# Patient Record
Sex: MALE | Race: WHITE | NOT HISPANIC OR LATINO | Employment: OTHER | URBAN - METROPOLITAN AREA
[De-identification: names, ages, dates, MRNs, and addresses within clinical notes are randomized per-mention and may not be internally consistent; named-entity substitution may affect disease eponyms.]

---

## 2021-06-17 ENCOUNTER — TELEPHONE (OUTPATIENT)
Dept: PAIN MEDICINE | Facility: CLINIC | Age: 66
End: 2021-06-17

## 2021-06-17 NOTE — TELEPHONE ENCOUNTER
LM on VM for patient to call his insurance company and make sure Dr Anh Ambrosio is PAR  Or to find out if he has new insurance? Insurance in his chart is showing [employer], most of them we do not take  It is hard to know without a scan of the card  Also checked on [employer]ail[employer] with Cap Reynaldo, and the patient is not found  Provided call back number should the patient have any questions

## 2021-06-30 NOTE — PROGRESS NOTES
Assessment and Plan:   Patient is a 28-year-old male with history of polymyalgia rheumatica who presents for rheumatology evaluation  Records were reviewed from previous rheumatologist at [facility] who she last saw in 2019  When he initially presented to them in 2018, he had stiffness and arthralgia in his hips and shoulders but also reports multiple other joints including his elbows, hands, knees and ankles  He does have known advanced osteoarthritis in the left ankle and right knee, and previously was considering joint replacements, but then the pandemic occurred and he also retired and lost insurance  He needs to reestablish with an orthopedic  He otherwise reports he is feeling similar to how he did when he initially met with [facility]  Has stiffness in the morning for at least 1 hour in his joints and he again complains of multiple sites of joint pain, not just the shoulders and hips  We discussed that we will reassess some blood work to look for systemic inflammation which I would expect with PMR  He had mild inflammation in the blood in 2018  We discussed some of the joint pain might be regular osteoarthritis and my not all be related to PMR  He was understanding and will get the blood work drawn  We will limited to what we absolutely need since he reports a high out-of-pocket deductible for his testing  For this reason we will follow-up in 3 months but in the meantime we will let him know how the blood work looks and if we should start on the prednisone      Plan:  Diagnoses and all orders for this visit:    PMR (polymyalgia rheumatica) (HCC)  -     Cyclic citrul peptide antibody, IgG  -     C-reactive protein  -     Sjogren's Antibodies  -     Sedimentation rate, automated  -     RF Screen w/ Reflex to Titer  -     Comprehensive metabolic panel  -     CBC and differential    Polyarthralgia  -     Cyclic citrul peptide antibody, IgG  -     C-reactive protein  -     Sjogren's Antibodies  -     Sedimentation rate, automated  -     RF Screen w/ Reflex to Titer  -     Comprehensive metabolic panel  -     CBC and differential    Primary osteoarthritis involving multiple joints    Other orders  -     Cancel: Chronic Hepatitis Panel        Follow-up plan: 3 months       HPI  Bryce Muñoz is a 63 y o   male with asthma, COPD, h/o 3 left ankle surgeries, right knee OA, cervical DDD/OA with radiculopathy, H/O mediastinal mass biopsy who presents for rheumatology evaluation for h/o PMR  Chart review shows patient previously followed with [facility] rheum, Dr Daisy Griffin, records reviewed, last seen 7/2019  -Initial rheum eval 5/2018 for stiffness and arthralgia B/L shoulders and hips, mild elevation in ESR of 35 and CRP 14, started pred 10mg for suspected PMR with good response; weaned down by 1mg over prolonged period of time   -Labs 5/2018: ESR 35, CRP 14 8, neg DE, RF, CCP     He has not seen any doctors in about 2 years  He retired in 8/2020 and did not have insurance until sometime last year  Still needs to get a PCP  He was on prednisone up until oct 2019 and then stopped it when he lost insurance and his doctors  Recalls when he saw rheum then he was having issues with his shoulders, elbows, wrists, hands, hips  Morning stiffness for at least 1 hour  Feels better with movement and he feels better as the day goes on  Feels worse compared to when he initially saw rheum in 2018, but feels exactly the same as it did when he first saw them  Has felt worse again for about 1 5 years now  Before this, he got injured at work, 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, so he saw Dr Eleno Ramos at that time, this was around 2010  He recalls taking prednisone which he took for 1 year, then never saw her again after he completed the prednisone     With [facility] rheum, he felt significantly better after starting the prednisone 10mg, this kicked in on day 7 and felt a drastic overnight change  He recalls weaning down by 1mg every few months and did ok with this until he reached 4mg daily and then was stuck at that dose, couldn't wean further  Denies photosensitivity, rashes, psoriasis, sicca symptoms, oral or nasal ulcers, alopecia, Raynaud's, h/o pericarditis or pleurisy, h/o blood clots  He [facility] but reports this surgeon was experienced with ankle replacements so he deferred this  He was supposed to get the R knee TKA in 2019 but then retired and lost his insurance  He has not seen ortho yet again in f/u since then  Currently taking Tylenol Arthritis  Also recalls being on gabapentin through [facility] but has been off of this since 2019  Review of Systems  Review of Systems   Constitutional: Negative for chills, fatigue, fever and unexpected weight change  HENT: Negative for mouth sores and trouble swallowing  Eyes: Negative for pain and visual disturbance  Respiratory: Negative for cough and shortness of breath  Cardiovascular: Negative for chest pain and leg swelling  Gastrointestinal: Negative for abdominal pain, blood in stool, constipation, diarrhea and nausea  Musculoskeletal: Positive for arthralgias and neck pain  Negative for back pain, joint swelling and myalgias  Skin: Negative for color change and rash  Neurological: Negative for weakness and numbness  Hematological: Negative for adenopathy  Psychiatric/Behavioral: Negative for sleep disturbance  Allergies  No Known Allergies    Home Medications  No current outpatient medications on file      Past Medical History  Past Medical History:   Diagnosis Date    COPD [facility]chronic obstructive pulmonary disease) [facility]     PMR [facility]polymyalgia rheumatica) ([facility]     Spinal arthritis        Past Surgical History   Past Surgical History:   Procedure Laterality Date    ANKLE SURGERY      KNEE SURGERY         Family History  No known family history of autoimmune or inflammatory diseases  History reviewed  No pertinent family history  Social History  Occupation: retired, used to work security [employer]   Social History     Substance and Sexual Activity   Alcohol Use Yes    Comment: socially biweekly     Social History     Substance and Sexual Activity   Drug Use Never     Social History     Tobacco Use   Smoking Status Current Every Day Smoker    Packs/day: 0 50    Types: Cigarettes   Smokeless Tobacco Never Used       Objective:    Vitals:    07/02/21 1252   Pulse: 80   Weight: 95 2 kg (209 lb 12 8 oz)   Height: 5' 6 5" (1 689 m)       Physical Exam  Constitutional:       General: He is not in acute distress  Appearance: He is well-developed  HENT:      Head: Normocephalic and atraumatic  Eyes:      General: Lids are normal  No scleral icterus  Conjunctiva/sclera: Conjunctivae normal    Pulmonary:      Effort: Pulmonary effort is normal  No tachypnea, accessory muscle usage or respiratory distress  Musculoskeletal:      Cervical back: Neck supple  Comments: No joint swelling or synovitis anywhere  Stiffness in both shoulders with passive ROM   Skin:     General: Skin is dry  Findings: No rash  Neurological:      Mental Status: He is alert  Comments: 5/5 motor strength in bilateral upper and lower extremities   Psychiatric:         Behavior: Behavior normal  Behavior is cooperative  Imaging:   MRI cervical 2018: IMPRESSION:     1  No acute injury of the cervical spine or cervical spinal cord  2  Multilevel moderate to severe chronic degenerative changes as   detailed above  These have generally slightly progressed since the   prior study in 2015    3  1 5 cm cystic-appearing lesion along of the RIGHT oral pharyngeal   mucosa at the level of the tongue base/lingual tonsil   Likely   represents mucus retention cyst  However, this could be evaluated   with direct clinical inspection and/or neck CT with IV contrast for   further evaluation as clinically indicated         Labs:    Ref Range & Units 7/12/19  6:09 AM   Sed Rate 0 - 20 mm/hr 11       Ref Range & Units 7/12/19  6:09 AM   C-Reactive Protein <7 0 mg/L 3 1       Ref Range & Units 5/7/19  6:26 AM   Hemoglobin 12 5 - 17 0 g/dL 14 3        Hematocrit 37 0 - 48 0 % 43 2        WBC 4 0 - 10 5 thou/cmm 9 1        RBC 4 00 - 5 40 mill/cmm 4 71        Platelet Count 314 - 350 thou/cmm 250        MPV 7 5 - 11 3 fL 7 4Low         MCV 80 - 100 fL 92        MCH 27 - 36 pg 30 4        MCHC 32 - 37 g/dL 33 2        RDW 12 0 - 16 0 % 13 7        Morphology   Not performed        Differential Type   AUTO        Absolute Neutrophils 1 8 - 7 9 thou/cmm 4 8        Absolute Lymphocytes 0 6 - 4 2 thou/cmm 2 9        Absolute Monocytes 0 2 - 1 4 thou/cmm 0 8        Absolute Eosinophils 0 - 0 7 thou/cmm 0 5        Absolute Basophils 0 - 0 2 thou/cmm 0 0        Neutrophils 45 - 75 % 54        Lymphocytes 15 - 40 % 32        Monocytes 5 - 13 % 9        Eosinophils 0 - 7 % 5        Basophils 0 - 2 % 0       Ref Range & Units 5/7/19  6:26 AM   Glucose 65 - 99 mg/dL 91        BUN 7 - 28 mg/dL 18        Creatinine 0 53 - 1 30 mg/dL 0 88        Sodium 135 - 145 mmol/L 141        Potassium 3 5 - 5 2 mmol/L 4 1        Chloride 100 - 109 mmol/L 107        Carbon Dioxide 23 - 31 mmol/L 27        Calcium 8 5 - 10 1 mg/dL 9 0        Alkaline Phosphatase 35 - 120 U/L 106        Albumin 3 5 - 4 8 g/dL 3 9        Bilirubin, Total 0 2 - 1 0 mg/dL 0 4        Protein, Total 6 3 - 8 3 g/dL 7 1        AST <41 U/L 12        ALT <56 U/L 26        Anion Gap 3 - 11  7        GFR, Calculated >60 mL/min/1 73m2 93

## 2021-07-02 ENCOUNTER — APPOINTMENT (OUTPATIENT)
Dept: LAB | Facility: CLINIC | Age: 66
End: 2021-07-02
Payer: COMMERCIAL

## 2021-07-02 ENCOUNTER — OFFICE VISIT (OUTPATIENT)
Dept: RHEUMATOLOGY | Facility: CLINIC | Age: 66
End: 2021-07-02
Payer: COMMERCIAL

## 2021-07-02 VITALS — BODY MASS INDEX: 32.93 KG/M2 | WEIGHT: 209.8 LBS | HEIGHT: 67 IN | HEART RATE: 80 BPM

## 2021-07-02 DIAGNOSIS — M15.9 PRIMARY OSTEOARTHRITIS INVOLVING MULTIPLE JOINTS: ICD-10-CM

## 2021-07-02 DIAGNOSIS — M25.50 POLYARTHRALGIA: ICD-10-CM

## 2021-07-02 DIAGNOSIS — M35.3 PMR (POLYMYALGIA RHEUMATICA) (HCC): Primary | ICD-10-CM

## 2021-07-02 LAB
ALBUMIN SERPL BCP-MCNC: 4.2 G/DL (ref 3.5–5)
ALP SERPL-CCNC: 102 U/L (ref 46–116)
ALT SERPL W P-5'-P-CCNC: 33 U/L (ref 12–78)
ANION GAP SERPL CALCULATED.3IONS-SCNC: 10 MMOL/L (ref 4–13)
AST SERPL W P-5'-P-CCNC: 18 U/L (ref 5–45)
BASOPHILS # BLD AUTO: 0.06 THOUSANDS/ΜL (ref 0–0.1)
BASOPHILS NFR BLD AUTO: 1 % (ref 0–1)
BILIRUB SERPL-MCNC: 0.62 MG/DL (ref 0.2–1)
BUN SERPL-MCNC: 17 MG/DL (ref 5–25)
CALCIUM SERPL-MCNC: 8.8 MG/DL (ref 8.3–10.1)
CHLORIDE SERPL-SCNC: 105 MMOL/L (ref 100–108)
CO2 SERPL-SCNC: 26 MMOL/L (ref 21–32)
CREAT SERPL-MCNC: 0.88 MG/DL (ref 0.6–1.3)
CRP SERPL QL: 4.5 MG/L
EOSINOPHIL # BLD AUTO: 0.17 THOUSAND/ΜL (ref 0–0.61)
EOSINOPHIL NFR BLD AUTO: 2 % (ref 0–6)
ERYTHROCYTE [DISTWIDTH] IN BLOOD BY AUTOMATED COUNT: 13.2 % (ref 11.6–15.1)
ERYTHROCYTE [SEDIMENTATION RATE] IN BLOOD: 12 MM/HOUR (ref 0–19)
GFR SERPL CREATININE-BSD FRML MDRD: 91 ML/MIN/1.73SQ M
GLUCOSE P FAST SERPL-MCNC: 102 MG/DL (ref 65–99)
HCT VFR BLD AUTO: 48.6 % (ref 36.5–49.3)
HGB BLD-MCNC: 15.9 G/DL (ref 12–17)
IMM GRANULOCYTES # BLD AUTO: 0.03 THOUSAND/UL (ref 0–0.2)
IMM GRANULOCYTES NFR BLD AUTO: 0 % (ref 0–2)
LYMPHOCYTES # BLD AUTO: 2.45 THOUSANDS/ΜL (ref 0.6–4.47)
LYMPHOCYTES NFR BLD AUTO: 27 % (ref 14–44)
MCH RBC QN AUTO: 30.3 PG (ref 26.8–34.3)
MCHC RBC AUTO-ENTMCNC: 32.7 G/DL (ref 31.4–37.4)
MCV RBC AUTO: 93 FL (ref 82–98)
MONOCYTES # BLD AUTO: 0.68 THOUSAND/ΜL (ref 0.17–1.22)
MONOCYTES NFR BLD AUTO: 8 % (ref 4–12)
NEUTROPHILS # BLD AUTO: 5.67 THOUSANDS/ΜL (ref 1.85–7.62)
NEUTS SEG NFR BLD AUTO: 62 % (ref 43–75)
NRBC BLD AUTO-RTO: 0 /100 WBCS
PLATELET # BLD AUTO: 263 THOUSANDS/UL (ref 149–390)
PMV BLD AUTO: 9 FL (ref 8.9–12.7)
POTASSIUM SERPL-SCNC: 3.8 MMOL/L (ref 3.5–5.3)
PROT SERPL-MCNC: 8 G/DL (ref 6.4–8.2)
RBC # BLD AUTO: 5.24 MILLION/UL (ref 3.88–5.62)
SODIUM SERPL-SCNC: 141 MMOL/L (ref 136–145)
WBC # BLD AUTO: 9.06 THOUSAND/UL (ref 4.31–10.16)

## 2021-07-02 PROCEDURE — 86200 CCP ANTIBODY: CPT | Performed by: INTERNAL MEDICINE

## 2021-07-02 PROCEDURE — 85652 RBC SED RATE AUTOMATED: CPT | Performed by: INTERNAL MEDICINE

## 2021-07-02 PROCEDURE — 86140 C-REACTIVE PROTEIN: CPT | Performed by: INTERNAL MEDICINE

## 2021-07-02 PROCEDURE — 80053 COMPREHEN METABOLIC PANEL: CPT | Performed by: INTERNAL MEDICINE

## 2021-07-02 PROCEDURE — 36415 COLL VENOUS BLD VENIPUNCTURE: CPT | Performed by: INTERNAL MEDICINE

## 2021-07-02 PROCEDURE — 86235 NUCLEAR ANTIGEN ANTIBODY: CPT | Performed by: INTERNAL MEDICINE

## 2021-07-02 PROCEDURE — 99205 OFFICE O/P NEW HI 60 MIN: CPT | Performed by: INTERNAL MEDICINE

## 2021-07-02 PROCEDURE — 85025 COMPLETE CBC W/AUTO DIFF WBC: CPT | Performed by: INTERNAL MEDICINE

## 2021-07-02 PROCEDURE — 86430 RHEUMATOID FACTOR TEST QUAL: CPT | Performed by: INTERNAL MEDICINE

## 2021-07-03 LAB
ENA SS-A AB SER-ACNC: <0.2 AI (ref 0–0.9)
ENA SS-B AB SER-ACNC: <0.2 AI (ref 0–0.9)

## 2021-07-06 ENCOUNTER — TELEPHONE (OUTPATIENT)
Dept: RHEUMATOLOGY | Facility: CLINIC | Age: 66
End: 2021-07-06

## 2021-07-06 DIAGNOSIS — M15.9 PRIMARY OSTEOARTHRITIS INVOLVING MULTIPLE JOINTS: Primary | ICD-10-CM

## 2021-07-06 LAB
CCP AB SER IA-ACNC: 0.7
RHEUMATOID FACT SER QL LA: NEGATIVE

## 2021-07-06 RX ORDER — MELOXICAM 15 MG/1
15 TABLET ORAL DAILY PRN
Qty: 30 TABLET | Refills: 2 | Status: SHIPPED | OUTPATIENT
Start: 2021-07-06 | End: 2021-08-20 | Stop reason: ALTCHOICE

## 2021-07-06 NOTE — TELEPHONE ENCOUNTER
Please let patient know the labs do not show any significant inflammation that I would expect with a flare of PMR  He might be experiencing pain from regular osteoarthritis rather than PMR  Does he want to try a prescription NSAID?  I am not sure the prednisone would really help without any inflammation in the blood work

## 2021-08-20 DIAGNOSIS — M15.9 PRIMARY OSTEOARTHRITIS INVOLVING MULTIPLE JOINTS: Primary | ICD-10-CM

## 2021-08-20 RX ORDER — CELECOXIB 200 MG/1
200 CAPSULE ORAL 2 TIMES DAILY PRN
Qty: 60 CAPSULE | Refills: 2 | Status: SHIPPED | OUTPATIENT
Start: 2021-08-20 | End: 2021-10-07 | Stop reason: ALTCHOICE

## 2021-08-20 NOTE — TELEPHONE ENCOUNTER
Patient advised he was prescribed Meloxicam  He advised it is not working and he would like to know if there is something different you can prescribe  Patient can be reached at [phone]   Thank you

## 2021-09-03 NOTE — TELEPHONE ENCOUNTER
Patient spoke with billing and insurance, one of the labs coded  price $197 was not covered due to coding error, Please contact patient back, he has nothing that states which lab

## 2021-09-03 NOTE — TELEPHONE ENCOUNTER
Not sure what this means   Tried to look up this code and cannot find anything, can you get more info

## 2021-09-22 NOTE — TELEPHONE ENCOUNTER
Please let patient know he has osteoarthritis and I really cannot excuse him from jury duty for this  I cannot comment on his COPD he needs to check with his pulm or family doctor for that

## 2021-09-22 NOTE — TELEPHONE ENCOUNTER
Pt received another invoice for the billing error, he would please like a call back today if possible to try & straighten it out      [facility] - [phone]

## 2021-09-22 NOTE — TELEPHONE ENCOUNTER
Pt sees Ledy Tolentino    [address] [facility] [address] on 10/[facility] , pt would like a note that he is unable to attend due to his Arthritis & COPD      Please Advise  cb - [phone]

## 2021-10-07 ENCOUNTER — OFFICE VISIT (OUTPATIENT)
Dept: RHEUMATOLOGY | Facility: CLINIC | Age: 66
End: 2021-10-07
Payer: COMMERCIAL

## 2021-10-07 ENCOUNTER — APPOINTMENT (OUTPATIENT)
Dept: LAB | Facility: CLINIC | Age: 66
End: 2021-10-07
Payer: COMMERCIAL

## 2021-10-07 VITALS — BODY MASS INDEX: 33.36 KG/M2 | HEIGHT: 67 IN

## 2021-10-07 DIAGNOSIS — M25.542 ARTHRALGIA OF BOTH HANDS: ICD-10-CM

## 2021-10-07 DIAGNOSIS — M25.541 ARTHRALGIA OF BOTH HANDS: ICD-10-CM

## 2021-10-07 DIAGNOSIS — M25.50 POLYARTHRALGIA: ICD-10-CM

## 2021-10-07 DIAGNOSIS — M15.9 PRIMARY OSTEOARTHRITIS INVOLVING MULTIPLE JOINTS: Primary | ICD-10-CM

## 2021-10-07 LAB
CRP SERPL QL: 4.1 MG/L
ERYTHROCYTE [SEDIMENTATION RATE] IN BLOOD: 17 MM/HOUR (ref 0–19)

## 2021-10-07 PROCEDURE — 86140 C-REACTIVE PROTEIN: CPT | Performed by: INTERNAL MEDICINE

## 2021-10-07 PROCEDURE — 99215 OFFICE O/P EST HI 40 MIN: CPT | Performed by: INTERNAL MEDICINE

## 2021-10-07 PROCEDURE — 36415 COLL VENOUS BLD VENIPUNCTURE: CPT | Performed by: INTERNAL MEDICINE

## 2021-10-07 PROCEDURE — 85652 RBC SED RATE AUTOMATED: CPT | Performed by: INTERNAL MEDICINE

## 2021-10-07 RX ORDER — DICLOFENAC SODIUM 75 MG/1
75 TABLET, DELAYED RELEASE ORAL 2 TIMES DAILY PRN
Qty: 60 TABLET | Refills: 2 | Status: SHIPPED | OUTPATIENT
Start: 2021-10-07 | End: 2021-12-30 | Stop reason: ALTCHOICE

## 2021-12-15 ENCOUNTER — APPOINTMENT (OUTPATIENT)
Dept: RADIOLOGY | Facility: CLINIC | Age: 66
End: 2021-12-15
Payer: COMMERCIAL

## 2021-12-15 DIAGNOSIS — M15.9 PRIMARY OSTEOARTHRITIS INVOLVING MULTIPLE JOINTS: ICD-10-CM

## 2021-12-15 PROCEDURE — 73030 X-RAY EXAM OF SHOULDER: CPT

## 2021-12-15 PROCEDURE — 73562 X-RAY EXAM OF KNEE 3: CPT

## 2021-12-16 ENCOUNTER — HOSPITAL ENCOUNTER (OUTPATIENT)
Dept: RADIOLOGY | Facility: HOSPITAL | Age: 66
Discharge: HOME | End: 2021-12-16
Payer: COMMERCIAL

## 2021-12-16 DIAGNOSIS — M25.541 ARTHRALGIA OF BOTH HANDS: ICD-10-CM

## 2021-12-16 DIAGNOSIS — M25.542 ARTHRALGIA OF BOTH HANDS: ICD-10-CM

## 2021-12-16 PROCEDURE — 76882 US LMTD JT/FCL EVL NVASC XTR: CPT

## 2021-12-30 ENCOUNTER — OFFICE VISIT (OUTPATIENT)
Dept: RHEUMATOLOGY | Facility: CLINIC | Age: 66
End: 2021-12-30
Payer: COMMERCIAL

## 2021-12-30 VITALS — BODY MASS INDEX: 32.8 KG/M2 | HEIGHT: 67 IN | WEIGHT: 209 LBS

## 2021-12-30 DIAGNOSIS — M50.90 CERVICAL DISC DISEASE: ICD-10-CM

## 2021-12-30 DIAGNOSIS — M15.9 PRIMARY OSTEOARTHRITIS INVOLVING MULTIPLE JOINTS: Primary | ICD-10-CM

## 2021-12-30 PROCEDURE — 99214 OFFICE O/P EST MOD 30 MIN: CPT | Performed by: INTERNAL MEDICINE

## 2022-08-29 ENCOUNTER — TELEPHONE (OUTPATIENT)
Dept: FAMILY MEDICINE CLINIC | Facility: CLINIC | Age: 67
End: 2022-08-29

## 2022-10-05 ENCOUNTER — OFFICE VISIT (OUTPATIENT)
Dept: FAMILY MEDICINE CLINIC | Facility: CLINIC | Age: 67
End: 2022-10-05
Payer: MEDICARE

## 2022-10-05 VITALS
HEART RATE: 102 BPM | HEIGHT: 67 IN | OXYGEN SATURATION: 94 % | BODY MASS INDEX: 33.43 KG/M2 | DIASTOLIC BLOOD PRESSURE: 82 MMHG | WEIGHT: 213 LBS | SYSTOLIC BLOOD PRESSURE: 128 MMHG | TEMPERATURE: 97.8 F

## 2022-10-05 DIAGNOSIS — M17.11 PRIMARY OSTEOARTHRITIS OF RIGHT KNEE: ICD-10-CM

## 2022-10-05 DIAGNOSIS — G47.09 OTHER INSOMNIA: ICD-10-CM

## 2022-10-05 DIAGNOSIS — Z23 FLU VACCINE NEED: ICD-10-CM

## 2022-10-05 DIAGNOSIS — Z76.89 ENCOUNTER TO ESTABLISH CARE: ICD-10-CM

## 2022-10-05 DIAGNOSIS — Z00.00 MEDICARE ANNUAL WELLNESS VISIT, INITIAL: ICD-10-CM

## 2022-10-05 DIAGNOSIS — F17.210 CIGARETTE NICOTINE DEPENDENCE WITHOUT COMPLICATION: ICD-10-CM

## 2022-10-05 DIAGNOSIS — M47.812 CERVICAL SPONDYLOSIS WITHOUT MYELOPATHY: ICD-10-CM

## 2022-10-05 DIAGNOSIS — Z13.220 LIPID SCREENING: ICD-10-CM

## 2022-10-05 DIAGNOSIS — J44.9 CHRONIC OBSTRUCTIVE PULMONARY DISEASE, UNSPECIFIED COPD TYPE (HCC): Primary | ICD-10-CM

## 2022-10-05 DIAGNOSIS — Z12.11 SCREEN FOR COLON CANCER: ICD-10-CM

## 2022-10-05 DIAGNOSIS — R53.83 OTHER FATIGUE: ICD-10-CM

## 2022-10-05 DIAGNOSIS — M35.3 PMR (POLYMYALGIA RHEUMATICA) (HCC): ICD-10-CM

## 2022-10-05 DIAGNOSIS — Z12.5 SCREENING PSA (PROSTATE SPECIFIC ANTIGEN): ICD-10-CM

## 2022-10-05 DIAGNOSIS — Z11.59 ENCOUNTER FOR HEPATITIS C SCREENING TEST FOR LOW RISK PATIENT: ICD-10-CM

## 2022-10-05 DIAGNOSIS — Z23 NEED FOR PNEUMOCOCCAL VACCINE: ICD-10-CM

## 2022-10-05 DIAGNOSIS — Z12.2 SCREENING FOR LUNG CANCER: ICD-10-CM

## 2022-10-05 DIAGNOSIS — R73.09 ELEVATED GLUCOSE: ICD-10-CM

## 2022-10-05 PROCEDURE — G0008 ADMIN INFLUENZA VIRUS VAC: HCPCS | Performed by: FAMILY MEDICINE

## 2022-10-05 PROCEDURE — 99204 OFFICE O/P NEW MOD 45 MIN: CPT | Performed by: FAMILY MEDICINE

## 2022-10-05 PROCEDURE — G0438 PPPS, INITIAL VISIT: HCPCS | Performed by: FAMILY MEDICINE

## 2022-10-05 PROCEDURE — 90677 PCV20 VACCINE IM: CPT | Performed by: FAMILY MEDICINE

## 2022-10-05 PROCEDURE — 90662 IIV NO PRSV INCREASED AG IM: CPT | Performed by: FAMILY MEDICINE

## 2022-10-05 PROCEDURE — G0009 ADMIN PNEUMOCOCCAL VACCINE: HCPCS | Performed by: FAMILY MEDICINE

## 2022-10-05 RX ORDER — ACETAMINOPHEN 500 MG
500 TABLET ORAL EVERY 6 HOURS PRN
COMMUNITY

## 2022-10-05 NOTE — ASSESSMENT & PLAN NOTE
Discussed re-setting sleep cycle  Avoid napping during the day  Go to bed at the same time and get up at the same time every day  He can use melatonin as needed

## 2022-10-05 NOTE — ASSESSMENT & PLAN NOTE
I discussed with him that he is a candidate for lung cancer CT screening  The following Shared Decision-Making points were covered:  1  Benefits of screening were discussed, including the rates of reduction in death from lung cancer and other causes  Harms of screening were reviewed, including false positive tests, radiation exposure levels, risks of invasive procedures, risks of complications of screening, and risk of overdiagnosis  2  I counseled on the importance of adherence to annual lung cancer LDCT screening, impact of co-morbidities, and ability or willingness to undergo diagnosis and treatment  3  I counseled on the importance of maintaining abstinence as a former smoker or was counseled on the importance of smoking cessation if a current smoker    Review of Eligibility Criteria: He meets all of the criteria for Lung Cancer Screening  · He is 74 y o    · He has 20 pack year tobacco history and is a current smoker or has quit within the past 15 years  · He presents no signs or symptoms of lung cancer    After discussion, the patient decided to elect lung cancer screening

## 2022-10-05 NOTE — PROGRESS NOTES
Assessment and Plan:     Problem List Items Addressed This Visit        Respiratory    Chronic obstructive pulmonary disease, unspecified COPD type ([facility] [address] ) - Primary     Counseling to quit smoking  Discussed options - pt is precontemplative         Relevant Medications    ePHEDrine HCl (PRIMATENE PO)       Musculoskeletal and Integument    Primary osteoarthritis of right knee     continue f/u with orthopedics         Cervical spondylosis without myelopathy     Pt is seeing ortho and neurology            Other    Cigarette nicotine dependence without complication     Counseling to quit - pt is pre contemplative          Relevant Orders    CT lung screening program    Screening for lung cancer     I discussed with him that he is a candidate for lung cancer CT screening  The following Shared Decision-Making points were covered:  Benefits of screening were discussed, including the rates of reduction in death from lung cancer and other causes  Harms of screening were reviewed, including false positive tests, radiation exposure levels, risks of invasive procedures, risks of complications of screening, and risk of overdiagnosis  I counseled on the importance of adherence to annual lung cancer LDCT screening, impact of co-morbidities, and ability or willingness to undergo diagnosis and treatment  I counseled on the importance of maintaining abstinence as a former smoker or was counseled on the importance of smoking cessation if a current smoker    Review of Eligibility Criteria: He meets all of the criteria for Lung Cancer Screening  He is 74 y o  He has 20 pack year tobacco history and is a current smoker or has quit within the past 15 years  He presents no signs or symptoms of lung cancer    After discussion, the patient decided to elect lung cancer screening  Relevant Orders    CT lung screening program    Other insomnia     Discussed re-setting sleep cycle  Avoid napping during the day   Go to bed at the same time and get up at the same time every day  He can use melatonin as needed  RESOLVED: PMR (polymyalgia rheumatica) (HCC)      Other Visit Diagnoses     Need for pneumococcal vaccine        Relevant Orders    Pneumococcal Conjugate Vaccine 20-valent (Pcv20) (Completed)    Flu vaccine need        Relevant Orders    FLUZONE HIGH-DOSE: influenza vaccine, high-dose, preservative-free 0 7 mL (Completed)    Encounter to establish care        Medicare annual wellness visit, initial        Other fatigue        Relevant Orders    CBC and differential    Comprehensive metabolic panel    TSH, 3rd generation with Free T4 reflex    Lipid screening        Relevant Orders    Lipid Panel with Direct LDL reflex    Encounter for hepatitis C screening test for low risk patient        Relevant Orders    Hepatitis C antibody    Screening PSA (prostate specific antigen)        Relevant Orders    PSA, Total Screen    Screen for colon cancer        Relevant Orders    Cologuard    Elevated glucose        Relevant Orders    Hemoglobin A1C        BMI Counseling: Body mass index is 33 36 kg/m²  The BMI is above normal  Nutrition recommendations include encouraging healthy choices of fruits and vegetables  Exercise recommendations include exercising 3-5 times per week  Rationale for BMI follow-up plan is due to patient being overweight or obese  Depression Screening and Follow-up Plan: Patient was screened for depression during today's encounter  They screened negative with a PHQ-2 score of 0  Preventive health issues were discussed with patient, and age appropriate screening tests were ordered as noted in patient's After Visit Summary  Personalized health advice and appropriate referrals for health education or preventive services given if needed, as noted in patient's After Visit Summary  History of Present Illness:     Patient presents for a Medicare Wellness Visit    Here to establish care   He has a h/o COPD and is a current smoker  He is due for lung cancer screening and agrees to this  We discussed options to help him quit smoking, he is not ready to quit at this time  States he has been tired for years, ever since he retired 3 years ago  He worked the night shift and now still having trouble sleeping  Tries to go to bed at midnight, wakes up at 3  AM  Wakes up and wants to eat and watch TV  Was used to getting up at this time due to working all night  Then goes back to bed at 10 AM -noon sleeps for another 2-3 hours  He naps frequently throughout the day  He tried Melatonin OTC 3 mg which helps a little bit  He is unsure if he snores  He has knee pain - has  H/o arthritis and is seeing [facility]  States he needs a knee replacement    He also states he has a history of neck problems and pain radiating down arms  He was seeing a neurologist for this  FH of DM -last glucose in chart was 102  States he was borderline diabetic in the past   He would like to get labs updated  Patient Care Team:  Angel Mckeon MD as PCP - General (Family Medicine)     Review of Systems:     Review of Systems   Constitutional: Positive for fatigue  Negative for activity change, appetite change and unexpected weight change  Respiratory: Negative for chest tightness and shortness of breath  Cardiovascular: Negative for chest pain and leg swelling  Gastrointestinal: Negative for abdominal pain  Musculoskeletal: Positive for arthralgias, neck pain and neck stiffness  Neurological: Negative for headaches  Psychiatric/Behavioral: Positive for sleep disturbance          Problem List:     Patient Active Problem List   Diagnosis    Cigarette nicotine dependence without complication    Screening for lung cancer    Chronic obstructive pulmonary disease, unspecified COPD type ([facility] [address] )    Primary osteoarthritis of right knee    Other insomnia    Cervical spondylosis without myelopathy      Past Medical and Surgical History:     Past Medical History:   Diagnosis Date    [facility]     PMR [facility]polymyalgia rheumatica) [facility]     Spinal arthritis      Past Surgical History:   Procedure Laterality Date    ANKLE SURGERY      KNEE SURGERY        Family History:     Family History   Problem Relation Age of Onset   Juan Fleming Breast cancer Mother     Diabetes Father     Esophageal cancer Father       Social History:     Social History     Socioeconomic History    Marital status: /Civil Union     Spouse name: None    Number of children: None    Years of education: None    Highest education level: None   Occupational History    None   Tobacco Use    Smoking status: Current Every Day Smoker     Packs/day: 0 50     Years: 40 00     Pack years: 20 00     Types: Cigarettes     Start date: 1975    Smokeless tobacco: Never Used   Substance and Sexual Activity    Alcohol use: Yes     Alcohol/week: 13 0 standard drinks     Types: 12 Cans of beer, 1 Shots of liquor per week    Drug use: Never    Sexual activity: None   Other Topics Concern    None   Social History Narrative    None     Social Determinants of Health     Financial Resource Strain: Low Risk     Difficulty of Paying Living Expenses: Not hard at all   Food Insecurity: Not on file   Transportation Needs: No Transportation Needs    Lack of Transportation (Medical): No    Lack of Transportation (Non-Medical): No   Physical Activity: Not on file   Stress: Not on file   Social Connections: Not on file   Intimate Partner Violence: Not on file   Housing Stability: Not on file      Medications and Allergies:     Current Outpatient Medications   Medication Sig Dispense Refill    acetaminophen (TYLENOL) 500 mg tablet Take 500 mg by mouth every 6 (six) hours as needed for mild pain      ePHEDrine HCl (PRIMATENE PO) Take by mouth       No current facility-administered medications for this visit       No Known Allergies Immunizations:     Immunization History   Administered Date(s) Administered    COVID-19 MODERNA VACC 0 5 ML IM 03/24/2021, 04/21/2021    H1N1, All Formulations 11/04/2009    Influenza Quadrivalent Preservative Free 3 years and older IM 10/04/2018    Influenza, high dose seasonal 0 7 mL 10/05/2022    Pneumococcal Conjugate Vaccine 20-valent (Pcv20), Polysace 10/05/2022      Health Maintenance:         Topic Date Due    Hepatitis C Screening  Never done    HIV Screening  Never done    Colorectal Cancer Screening  Never done    Lung Cancer Screening  Never done         Topic Date Due    COVID-19 Vaccine (3 - Booster for Devon Plaster series) 09/21/2021      Medicare Screening Tests and Risk Assessments:     Rubi Carednas is here for his Initial Wellness visit  Health Risk Assessment:   Patient rates overall health as fair  Patient feels that their physical health rating is same  Patient is satisfied with their life  Eyesight was rated as same  Hearing was rated as same  Patient feels that their emotional and mental health rating is same  Patients states they are never, rarely angry  Patient states they are always unusually tired/fatigued  Pain experienced in the last 7 days has been none  Patient states that he has experienced no weight loss or gain in last 6 months  Depression Screening:   PHQ-2 Score: 0      Fall Risk Screening: In the past year, patient has experienced: no history of falling in past year      Home Safety:  Patient has trouble with stairs inside or outside of their home  Patient has working smoke alarms and has working carbon monoxide detector  Home safety hazards include: none  Nutrition:   Current diet is Regular  Medications:   Patient is not currently taking any over-the-counter supplements  Patient is able to manage medications       Activities of Daily Living (ADLs)/Instrumental Activities of Daily Living (IADLs):   Walk and transfer into and out of bed and chair?: Yes  Dress and groom yourself?: Yes    Bathe or shower yourself?: Yes    Feed yourself? Yes  Do your laundry/housekeeping?: Yes  Manage your money, pay your bills and track your expenses?: Yes  Make your own meals?: Yes    Do your own shopping?: Yes    Previous Hospitalizations:   Any hospitalizations or ED visits within the last 12 months?: No      Cognitive Screening:   Provider or family/friend/caregiver concerned regarding cognition?: No    PREVENTIVE SCREENINGS      Cardiovascular Screening:    General: Risks and Benefits Discussed    Due for: Lipid Panel      Diabetes Screening:     General: Risks and Benefits Discussed    Due for: Blood Glucose      Colorectal Cancer Screening:     General: Risks and Benefits Discussed    Due for: Cologuard      Prostate Cancer Screening:    General: Risks and Benefits Discussed    Due for: PSA      Osteoporosis Screening:    General: Screening Not Indicated      Abdominal Aortic Aneurysm (AAA) Screening:    Risk factors include: age between 73-67 yo and tobacco use        Lung Cancer Screening:     General: Screening Not Indicated and Risks and Benefits Discussed    Due for: Low Dose CT (LDCT)      Hepatitis C Screening:    General: Risks and Benefits Discussed    Hep C Screening Accepted: Yes      Screening, Brief Intervention, and Referral to Treatment (SBIRT)    Screening    Typical number of drinks in a week: 1    Single Item Drug Screening:  How often have you used an illegal drug (including marijuana) or a prescription medication for non-medical reasons in the past year? never    Single Item Drug Screen Score: 0  Interpretation: Negative screen for possible drug use disorder    No exam data present     Physical Exam:     /82 (BP Location: Left arm, Patient Position: Sitting)   Pulse 102   Temp 97 8 °F (36 6 °C) (Temporal)   Ht 5' 7" (1 702 m)   Wt 96 6 kg (213 lb)   SpO2 94%   BMI 33 36 kg/m²     Physical Exam  Vitals and nursing note reviewed     Constitutional: General: He is not in acute distress  Appearance: He is well-developed  He is not diaphoretic  HENT:      Head: Normocephalic and atraumatic  Right Ear: External ear normal  There is impacted cerumen  Left Ear: External ear normal    Cardiovascular:      Rate and Rhythm: Normal rate and regular rhythm  Heart sounds: Normal heart sounds  No murmur heard  No friction rub  No gallop  Pulmonary:      Effort: Pulmonary effort is normal  No respiratory distress  Breath sounds: Normal breath sounds  No stridor  No wheezing or rales  Chest:      Chest wall: No tenderness  Musculoskeletal:      Comments: R knee in brace  L ankle in brace   Skin:     Findings: No rash  Neurological:      General: No focal deficit present  Mental Status: He is alert  Mental status is at baseline  Cranial Nerves: No cranial nerve deficit  Psychiatric:         Behavior: Behavior normal          Thought Content:  Thought content normal          Judgment: Judgment normal           Azeb Mac MD

## 2022-10-06 ENCOUNTER — APPOINTMENT (OUTPATIENT)
Dept: LAB | Facility: CLINIC | Age: 67
End: 2022-10-06
Payer: COMMERCIAL

## 2022-10-06 DIAGNOSIS — Z13.220 LIPID SCREENING: ICD-10-CM

## 2022-10-06 DIAGNOSIS — R73.09 ELEVATED GLUCOSE: ICD-10-CM

## 2022-10-06 DIAGNOSIS — R53.83 OTHER FATIGUE: ICD-10-CM

## 2022-10-06 DIAGNOSIS — Z11.59 ENCOUNTER FOR HEPATITIS C SCREENING TEST FOR LOW RISK PATIENT: ICD-10-CM

## 2022-10-06 DIAGNOSIS — Z12.5 SCREENING PSA (PROSTATE SPECIFIC ANTIGEN): ICD-10-CM

## 2022-10-06 LAB
ALBUMIN SERPL BCP-MCNC: 3.9 G/DL (ref 3.5–5)
ALP SERPL-CCNC: 96 U/L (ref 46–116)
ALT SERPL W P-5'-P-CCNC: 30 U/L (ref 12–78)
ANION GAP SERPL CALCULATED.3IONS-SCNC: 7 MMOL/L (ref 4–13)
AST SERPL W P-5'-P-CCNC: 14 U/L (ref 5–45)
BASOPHILS # BLD AUTO: 0.05 THOUSANDS/ΜL (ref 0–0.1)
BASOPHILS NFR BLD AUTO: 1 % (ref 0–1)
BILIRUB SERPL-MCNC: 0.88 MG/DL (ref 0.2–1)
BUN SERPL-MCNC: 16 MG/DL (ref 5–25)
CALCIUM SERPL-MCNC: 9.1 MG/DL (ref 8.3–10.1)
CHLORIDE SERPL-SCNC: 107 MMOL/L (ref 96–108)
CHOLEST SERPL-MCNC: 146 MG/DL
CO2 SERPL-SCNC: 24 MMOL/L (ref 21–32)
CREAT SERPL-MCNC: 0.98 MG/DL (ref 0.6–1.3)
EOSINOPHIL # BLD AUTO: 0.37 THOUSAND/ΜL (ref 0–0.61)
EOSINOPHIL NFR BLD AUTO: 4 % (ref 0–6)
ERYTHROCYTE [DISTWIDTH] IN BLOOD BY AUTOMATED COUNT: 12.7 % (ref 11.6–15.1)
GFR SERPL CREATININE-BSD FRML MDRD: 80 ML/MIN/1.73SQ M
GLUCOSE P FAST SERPL-MCNC: 104 MG/DL (ref 65–99)
HCT VFR BLD AUTO: 49.6 % (ref 36.5–49.3)
HCV AB SER QL: NORMAL
HDLC SERPL-MCNC: 34 MG/DL
HGB BLD-MCNC: 16.3 G/DL (ref 12–17)
IMM GRANULOCYTES # BLD AUTO: 0.04 THOUSAND/UL (ref 0–0.2)
IMM GRANULOCYTES NFR BLD AUTO: 0 % (ref 0–2)
LDLC SERPL CALC-MCNC: 74 MG/DL (ref 0–100)
LYMPHOCYTES # BLD AUTO: 2.5 THOUSANDS/ΜL (ref 0.6–4.47)
LYMPHOCYTES NFR BLD AUTO: 27 % (ref 14–44)
MCH RBC QN AUTO: 30.8 PG (ref 26.8–34.3)
MCHC RBC AUTO-ENTMCNC: 32.9 G/DL (ref 31.4–37.4)
MCV RBC AUTO: 94 FL (ref 82–98)
MONOCYTES # BLD AUTO: 0.93 THOUSAND/ΜL (ref 0.17–1.22)
MONOCYTES NFR BLD AUTO: 10 % (ref 4–12)
NEUTROPHILS # BLD AUTO: 5.46 THOUSANDS/ΜL (ref 1.85–7.62)
NEUTS SEG NFR BLD AUTO: 58 % (ref 43–75)
NRBC BLD AUTO-RTO: 0 /100 WBCS
PLATELET # BLD AUTO: 242 THOUSANDS/UL (ref 149–390)
PMV BLD AUTO: 9.2 FL (ref 8.9–12.7)
POTASSIUM SERPL-SCNC: 3.9 MMOL/L (ref 3.5–5.3)
PROT SERPL-MCNC: 7.7 G/DL (ref 6.4–8.4)
PSA SERPL-MCNC: 0.3 NG/ML (ref 0–4)
RBC # BLD AUTO: 5.3 MILLION/UL (ref 3.88–5.62)
SODIUM SERPL-SCNC: 138 MMOL/L (ref 135–147)
TRIGL SERPL-MCNC: 188 MG/DL
TSH SERPL DL<=0.05 MIU/L-ACNC: 1.42 UIU/ML (ref 0.45–4.5)
WBC # BLD AUTO: 9.35 THOUSAND/UL (ref 4.31–10.16)

## 2022-10-06 PROCEDURE — G0103 PSA SCREENING: HCPCS

## 2022-10-06 PROCEDURE — 85025 COMPLETE CBC W/AUTO DIFF WBC: CPT

## 2022-10-06 PROCEDURE — 86803 HEPATITIS C AB TEST: CPT

## 2022-10-06 PROCEDURE — 84443 ASSAY THYROID STIM HORMONE: CPT

## 2022-10-06 PROCEDURE — 83036 HEMOGLOBIN GLYCOSYLATED A1C: CPT

## 2022-10-06 PROCEDURE — 80061 LIPID PANEL: CPT

## 2022-10-06 PROCEDURE — 36415 COLL VENOUS BLD VENIPUNCTURE: CPT

## 2022-10-06 PROCEDURE — 80053 COMPREHEN METABOLIC PANEL: CPT

## 2022-10-07 LAB
EST. AVERAGE GLUCOSE BLD GHB EST-MCNC: 114 MG/DL
HBA1C MFR BLD: 5.6 %

## 2022-10-27 ENCOUNTER — HOSPITAL ENCOUNTER (OUTPATIENT)
Dept: CT IMAGING | Facility: HOSPITAL | Age: 67
End: 2022-10-27
Payer: COMMERCIAL

## 2022-10-27 DIAGNOSIS — Z12.2 SCREENING FOR LUNG CANCER: ICD-10-CM

## 2022-10-27 DIAGNOSIS — F17.210 CIGARETTE NICOTINE DEPENDENCE WITHOUT COMPLICATION: ICD-10-CM

## 2022-10-27 PROCEDURE — 71271 CT THORAX LUNG CANCER SCR C-: CPT

## 2022-11-10 LAB — COLOGUARD RESULT REPORTABLE: NEGATIVE

## 2023-10-19 ENCOUNTER — TELEPHONE (OUTPATIENT)
Age: 68
End: 2023-10-19

## 2023-10-19 ENCOUNTER — OFFICE VISIT (OUTPATIENT)
Dept: FAMILY MEDICINE CLINIC | Facility: CLINIC | Age: 68
End: 2023-10-19
Payer: COMMERCIAL

## 2023-10-19 VITALS
SYSTOLIC BLOOD PRESSURE: 130 MMHG | BODY MASS INDEX: 33.56 KG/M2 | HEART RATE: 92 BPM | DIASTOLIC BLOOD PRESSURE: 76 MMHG | OXYGEN SATURATION: 95 % | TEMPERATURE: 98 F | HEIGHT: 67 IN | WEIGHT: 213.8 LBS

## 2023-10-19 DIAGNOSIS — N52.9 ERECTILE DYSFUNCTION, UNSPECIFIED ERECTILE DYSFUNCTION TYPE: ICD-10-CM

## 2023-10-19 DIAGNOSIS — M25.561 CHRONIC PAIN OF RIGHT KNEE: ICD-10-CM

## 2023-10-19 DIAGNOSIS — Z12.5 SCREENING PSA (PROSTATE SPECIFIC ANTIGEN): ICD-10-CM

## 2023-10-19 DIAGNOSIS — Z23 ENCOUNTER FOR IMMUNIZATION: ICD-10-CM

## 2023-10-19 DIAGNOSIS — G47.9 SLEEP DISORDER: ICD-10-CM

## 2023-10-19 DIAGNOSIS — G89.29 CHRONIC PAIN OF RIGHT KNEE: ICD-10-CM

## 2023-10-19 DIAGNOSIS — F17.210 CIGARETTE NICOTINE DEPENDENCE WITHOUT COMPLICATION: ICD-10-CM

## 2023-10-19 DIAGNOSIS — J44.9 CHRONIC OBSTRUCTIVE PULMONARY DISEASE, UNSPECIFIED COPD TYPE (HCC): Primary | ICD-10-CM

## 2023-10-19 DIAGNOSIS — R53.83 OTHER FATIGUE: ICD-10-CM

## 2023-10-19 DIAGNOSIS — Z13.220 LIPID SCREENING: ICD-10-CM

## 2023-10-19 DIAGNOSIS — R35.1 BPH ASSOCIATED WITH NOCTURIA: ICD-10-CM

## 2023-10-19 DIAGNOSIS — G47.19 EXCESSIVE DAYTIME SLEEPINESS: ICD-10-CM

## 2023-10-19 DIAGNOSIS — N40.1 BPH ASSOCIATED WITH NOCTURIA: ICD-10-CM

## 2023-10-19 DIAGNOSIS — R73.9 HYPERGLYCEMIA: ICD-10-CM

## 2023-10-19 DIAGNOSIS — Z00.00 MEDICARE ANNUAL WELLNESS VISIT, SUBSEQUENT: ICD-10-CM

## 2023-10-19 DIAGNOSIS — Z13.6 ENCOUNTER FOR ABDOMINAL AORTIC ANEURYSM (AAA) SCREENING: ICD-10-CM

## 2023-10-19 PROCEDURE — 90662 IIV NO PRSV INCREASED AG IM: CPT | Performed by: FAMILY MEDICINE

## 2023-10-19 PROCEDURE — 99214 OFFICE O/P EST MOD 30 MIN: CPT | Performed by: FAMILY MEDICINE

## 2023-10-19 PROCEDURE — G0008 ADMIN INFLUENZA VIRUS VAC: HCPCS | Performed by: FAMILY MEDICINE

## 2023-10-19 PROCEDURE — G0439 PPPS, SUBSEQ VISIT: HCPCS | Performed by: FAMILY MEDICINE

## 2023-10-19 RX ORDER — TAMSULOSIN HYDROCHLORIDE 0.4 MG/1
0.4 CAPSULE ORAL
Qty: 90 CAPSULE | Refills: 3 | Status: SHIPPED | OUTPATIENT
Start: 2023-10-19

## 2023-10-19 RX ORDER — SILDENAFIL 50 MG/1
50 TABLET, FILM COATED ORAL DAILY PRN
Qty: 10 TABLET | Refills: 5 | Status: SHIPPED | OUTPATIENT
Start: 2023-10-19

## 2023-10-19 NOTE — PROGRESS NOTES
Assessment and Plan:     Problem List Items Addressed This Visit        Respiratory    Chronic obstructive pulmonary disease, unspecified COPD type ([address]) - Primary     Advised follow up with Pulmonary          Relevant Orders    Ambulatory Referral to Pulmonology       Other    Cigarette nicotine dependence without complication     Counseling to quit - he states he is cutting back          Relevant Orders    CT lung screening program    Ambulatory Referral to Pulmonology    Sleep disorder     Advised sleep study  Advised to avoid napping and to keep a consistent bedtime routine          Relevant Orders    Home Study    Excessive daytime sleepiness     Advised to complete sleep study          Relevant Orders    Home Study    Chronic pain of right knee     Ortho referral provided         Relevant Orders    Ambulatory Referral to Orthopedic Surgery    BPH associated with nocturia     Will start Flomax  Check PSA  Discussed common side effects. Relevant Medications    tamsulosin (FLOMAX) 0.4 mg    Other fatigue     Likely multifactorial related to poor sleep habits. He has nocturia presumable related to BPH. Will start Flomax. Discussed common side effects.   Advised sleep study  Counseled on sleep hygeine  Update labs          Relevant Orders    Basic metabolic panel    Hyperglycemia    Relevant Orders    HEMOGLOBIN A1C W/ EAG ESTIMATION   Other Visit Diagnoses     Medicare annual wellness visit, subsequent        Lipid screening        Relevant Orders    Lipid Panel with Direct LDL reflex    Screening PSA (prostate specific antigen)        Relevant Orders    PSA, Total Screen    Encounter for abdominal aortic aneurysm (AAA) screening        Relevant Orders    US abdominal aorta screening aaa    Encounter for immunization        Relevant Orders    influenza vaccine, high-dose, PF 0.7 mL (FLUZONE HIGH-DOSE) (Completed)    Erectile dysfunction, unspecified erectile dysfunction type        Relevant Medications sildenafil (VIAGRA) 50 MG tablet        BMI Counseling: Body mass index is 33.49 kg/m². The BMI is above normal. Nutrition recommendations include encouraging healthy choices of fruits and vegetables. Rationale for BMI follow-up plan is due to patient being overweight or obese. Depression Screening and Follow-up Plan: Patient was screened for depression during today's encounter. They screened negative with a PHQ-2 score of 0. Tobacco Cessation Counseling: The patient is sincerely urged to quit consumption of tobacco. He is not ready to quit tobacco.       Preventive health issues were discussed with patient, and age appropriate screening tests were ordered as noted in patient's After Visit Summary. Personalized health advice and appropriate referrals for health education or preventive services given if needed, as noted in patient's After Visit Summary. History of Present Illness:     Patient presents for a Medicare Wellness Visit    Here for a check up  Labs are due     He says he is always fatigued and tired. Minor activities make him very tired - such as shopping. "No energy". He says his sleep is poor. He sleeps 1-2 hours. Nap throughout the day. Unsure if he snores, lives alone. He is awake to urinate 3-4 times a night. Denies chest pain. +shortness of breath as COPD. Not on treatment -used to see pulmonary   Current smoker. Says he is cutting back. He also wants to address his knee arthritis. He was told he needs a knee replacement but never followed up. He also reports ED and would like to try medications. Patient Care Team:  Mariola Jackson MD as PCP - General (Family Medicine)     Review of Systems:     Review of Systems   Constitutional:  Positive for fatigue. Negative for activity change, appetite change and unexpected weight change. Respiratory:  Negative for chest tightness and shortness of breath. Cardiovascular:  Negative for chest pain and leg swelling. Gastrointestinal:  Negative for abdominal pain. Genitourinary:         Nocturia  Incomplete emptying  Erectile dysfunction    Musculoskeletal:  Positive for arthralgias. Neurological:  Negative for headaches. Psychiatric/Behavioral:  Positive for sleep disturbance. Problem List:     Patient Active Problem List   Diagnosis   • Cigarette nicotine dependence without complication   • Screening for lung cancer   • Chronic obstructive pulmonary disease, unspecified COPD type ([address])   • Primary osteoarthritis of right knee   • Other insomnia   • Cervical spondylosis without myelopathy   • Sleep disorder   • Excessive daytime sleepiness   • Chronic pain of right knee   • BPH associated with nocturia   • Other fatigue   • Hyperglycemia      Past Medical and Surgical History:     Past Medical History:   Diagnosis Date   • COPD (chronic obstructive pulmonary disease) ([facility])    • PMR (polymyalgia rheumatica) ([facility])    • Spinal arthritis      Past Surgical History:   Procedure Laterality Date   • ANKLE SURGERY     • KNEE SURGERY        Family History:     Family History   Problem Relation Age of Onset   • Breast cancer Mother    • Diabetes Father    • Esophageal cancer Father       Social History:     Social History     Socioeconomic History   • Marital status: /Civil Union     Spouse name: None   • Number of children: None   • Years of education: None   • Highest education level: None   Occupational History   • None   Tobacco Use   • Smoking status: Every Day     Packs/day: 0.50     Years: 40.00     Total pack years: 20.00     Types: Cigarettes     Start date: 1975   • Smokeless tobacco: Never   Substance and Sexual Activity   • Alcohol use:  Yes     Alcohol/week: 13.0 standard drinks of alcohol     Types: 12 Cans of beer, 1 Shots of liquor per week   • Drug use: Never   • Sexual activity: None   Other Topics Concern   • None   Social History Narrative   • None     Social Determinants of Health     Financial Resource Strain: Low Risk  (10/19/2023)    Overall Financial Resource Strain (CARDIA)    • Difficulty of Paying Living Expenses: Not very hard   Food Insecurity: Not on file   Transportation Needs: No Transportation Needs (10/19/2023)    PRAPARE - Transportation    • Lack of Transportation (Medical): No    • Lack of Transportation (Non-Medical): No   Physical Activity: Not on file   Stress: Not on file   Social Connections: Not on file   Intimate Partner Violence: Not on file   Housing Stability: Not on file      Medications and Allergies:     Current Outpatient Medications   Medication Sig Dispense Refill   • acetaminophen (TYLENOL) 500 mg tablet Take 500 mg by mouth every 6 (six) hours as needed for mild pain     • sildenafil (VIAGRA) 50 MG tablet Take 1 tablet (50 mg total) by mouth daily as needed for erectile dysfunction 10 tablet 5   • tamsulosin (FLOMAX) 0.4 mg Take 1 capsule (0.4 mg total) by mouth daily with dinner 90 capsule 3     No current facility-administered medications for this visit. No Known Allergies   Immunizations:     Immunization History   Administered Date(s) Administered   • COVID-19 MODERNA VACC 0.5 ML IM 03/24/2021, 04/21/2021   • H1N1, All Formulations 11/04/2009   • Influenza Quadrivalent Preservative Free 3 years and older IM 10/04/2018   • Influenza, high dose seasonal 0.7 mL 10/05/2022, 10/19/2023   • Pneumococcal Conjugate Vaccine 20-valent (Pcv20), Polysace 10/05/2022      Health Maintenance:         Topic Date Due   • Lung Cancer Screening  10/27/2023   • Colorectal Cancer Screening  11/01/2025   • Hepatitis C Screening  Completed         Topic Date Due   • COVID-19 Vaccine (3 - Shayna Barefoot series) 06/16/2021      Medicare Screening Tests and Risk Assessments:     Jossie Palma is here for his Subsequent Wellness visit. Last Medicare Wellness visit information reviewed, patient interviewed and updates made to the record today.       Health Risk Assessment:   Patient rates overall health as good. Patient feels that their physical health rating is same. Patient is satisfied with their life. Eyesight was rated as same. Hearing was rated as same. Patient feels that their emotional and mental health rating is same. Patients states they are never, rarely angry. Patient states they are often unusually tired/fatigued. Pain experienced in the last 7 days has been some. Patient's pain rating has been 8/10. Patient states that he has experienced no weight loss or gain in last 6 months. Depression Screening:   PHQ-2 Score: 0      Fall Risk Screening: In the past year, patient has experienced: no history of falling in past year      Home Safety:  Patient has trouble with stairs inside or outside of their home. Patient has working smoke alarms and has working carbon monoxide detector. Home safety hazards include: none. Nutrition:   Current diet is Regular. Medications:   Patient is not currently taking any over-the-counter supplements. Patient is able to manage medications. Activities of Daily Living (ADLs)/Instrumental Activities of Daily Living (IADLs):   Walk and transfer into and out of bed and chair?: Yes  Dress and groom yourself?: Yes    Bathe or shower yourself?: Yes    Feed yourself?  Yes  Do your laundry/housekeeping?: Yes  Manage your money, pay your bills and track your expenses?: Yes  Make your own meals?: Yes    Do your own shopping?: Yes    Previous Hospitalizations:   Any hospitalizations or ED visits within the last 12 months?: No      Advance Care Planning:   Living will: No    Durable POA for healthcare: No    Advanced directive: No    ACP document given: Yes      Cognitive Screening:   Provider or family/friend/caregiver concerned regarding cognition?: No    PREVENTIVE SCREENINGS      Cardiovascular Screening:    General: Screening Current and Risks and Benefits Discussed    Due for: Lipid Panel      Diabetes Screening:     General: Risks and Benefits Discussed    Due for: Blood Glucose      Colorectal Cancer Screening:     General: Screening Current      Prostate Cancer Screening:    General: Risks and Benefits Discussed    Due for: PSA      Osteoporosis Screening:    General: Screening Not Indicated      Abdominal Aortic Aneurysm (AAA) Screening:    Risk factors include: age between 70-77 yo and tobacco use        Lung Cancer Screening:     General: Screening Not Indicated and Risks and Benefits Discussed    Due for: Low Dose CT (LDCT)      Hepatitis C Screening:    General: Screening Current    Screening, Brief Intervention, and Referral to Treatment (SBIRT)    Screening  Typical number of drinks in a day: 1  Typical number of drinks in a week: 5  Interpretation: Low risk drinking behavior. Single Item Drug Screening:  How often have you used an illegal drug (including marijuana) or a prescription medication for non-medical reasons in the past year? never    Single Item Drug Screen Score: 0  Interpretation: Negative screen for possible drug use disorder    No results found. Physical Exam:     /76   Pulse 92   Temp 98 °F (36.7 °C)   Ht 5' 7" (1.702 m)   Wt 97 kg (213 lb 12.8 oz)   SpO2 95%   BMI 33.49 kg/m²     Physical Exam  Vitals and nursing note reviewed. Constitutional:       General: He is not in acute distress. Appearance: He is well-developed. He is obese. He is not diaphoretic. HENT:      Head: Normocephalic and atraumatic. Right Ear: External ear normal.      Left Ear: External ear normal.      Mouth/Throat:      Mouth: Mucous membranes are moist.      Pharynx: Oropharynx is clear. Cardiovascular:      Rate and Rhythm: Normal rate and regular rhythm. Heart sounds: Normal heart sounds. No murmur heard. No friction rub. No gallop. Pulmonary:      Effort: Pulmonary effort is normal. No respiratory distress. Breath sounds: Normal breath sounds. No stridor. No wheezing or rales. Chest:      Chest wall: No tenderness. Musculoskeletal:         General: Deformity (R knee, wearing brace) present. Skin:     Findings: No rash. Neurological:      General: No focal deficit present. Mental Status: He is alert. Mental status is at baseline. Psychiatric:         Mood and Affect: Mood normal.         Behavior: Behavior normal.         Thought Content:  Thought content normal.         Judgment: Judgment normal.          Muna Johnson MD

## 2023-10-19 NOTE — ASSESSMENT & PLAN NOTE
Likely multifactorial related to poor sleep habits. He has nocturia presumable related to BPH. Will start Flomax. Discussed common side effects.   Advised sleep study  Counseled on sleep hygeine  Update labs

## 2023-10-20 ENCOUNTER — APPOINTMENT (OUTPATIENT)
Dept: LAB | Facility: CLINIC | Age: 68
End: 2023-10-20
Payer: COMMERCIAL

## 2023-10-20 ENCOUNTER — TELEPHONE (OUTPATIENT)
Age: 68
End: 2023-10-20

## 2023-10-20 DIAGNOSIS — R53.83 OTHER FATIGUE: ICD-10-CM

## 2023-10-20 DIAGNOSIS — Z12.5 SCREENING PSA (PROSTATE SPECIFIC ANTIGEN): ICD-10-CM

## 2023-10-20 DIAGNOSIS — R73.9 HYPERGLYCEMIA: ICD-10-CM

## 2023-10-20 DIAGNOSIS — Z13.220 LIPID SCREENING: ICD-10-CM

## 2023-10-20 LAB
ANION GAP SERPL CALCULATED.3IONS-SCNC: 6 MMOL/L
BUN SERPL-MCNC: 12 MG/DL (ref 5–25)
CALCIUM SERPL-MCNC: 9.1 MG/DL (ref 8.4–10.2)
CHLORIDE SERPL-SCNC: 102 MMOL/L (ref 96–108)
CHOLEST SERPL-MCNC: 149 MG/DL
CO2 SERPL-SCNC: 30 MMOL/L (ref 21–32)
CREAT SERPL-MCNC: 0.87 MG/DL (ref 0.6–1.3)
EST. AVERAGE GLUCOSE BLD GHB EST-MCNC: 117 MG/DL
GFR SERPL CREATININE-BSD FRML MDRD: 89 ML/MIN/1.73SQ M
GLUCOSE P FAST SERPL-MCNC: 97 MG/DL (ref 65–99)
HBA1C MFR BLD: 5.7 %
HDLC SERPL-MCNC: 34 MG/DL
LDLC SERPL CALC-MCNC: 79 MG/DL (ref 0–100)
POTASSIUM SERPL-SCNC: 4.3 MMOL/L (ref 3.5–5.3)
PSA SERPL-MCNC: 0.09 NG/ML (ref 0–4)
SODIUM SERPL-SCNC: 138 MMOL/L (ref 135–147)
TRIGL SERPL-MCNC: 181 MG/DL

## 2023-10-20 PROCEDURE — 83036 HEMOGLOBIN GLYCOSYLATED A1C: CPT

## 2023-10-20 PROCEDURE — G0103 PSA SCREENING: HCPCS

## 2023-10-20 PROCEDURE — 80061 LIPID PANEL: CPT

## 2023-10-20 PROCEDURE — 80048 BASIC METABOLIC PNL TOTAL CA: CPT

## 2023-10-20 PROCEDURE — 36415 COLL VENOUS BLD VENIPUNCTURE: CPT

## 2023-10-20 NOTE — TELEPHONE ENCOUNTER
Left message for pt to call office to schedule appt for consult for copd/cigarette use from referral list

## 2023-10-23 ENCOUNTER — TELEPHONE (OUTPATIENT)
Dept: SLEEP CENTER | Facility: CLINIC | Age: 68
End: 2023-10-23

## 2023-10-23 NOTE — TELEPHONE ENCOUNTER
----- Message from Emeka Santos DO sent at 10/23/2023  8:23 AM EDT -----  approved  ----- Message -----  From: Yuly Caruso  Sent: 10/20/2023   9:02 AM EDT  To: Sleep Medicine Satinder Provider    This Home sleep study needs approval.     If approved please sign and return to clerical pool. If denied please include reasons why. Also provide alternative testing if warranted. Please sign and return to clerical pool.

## 2023-10-28 ENCOUNTER — HOSPITAL ENCOUNTER (OUTPATIENT)
Dept: ULTRASOUND IMAGING | Facility: HOSPITAL | Age: 68
Discharge: HOME | End: 2023-10-28
Payer: COMMERCIAL

## 2023-10-28 ENCOUNTER — HOSPITAL ENCOUNTER (OUTPATIENT)
Dept: CT IMAGING | Facility: HOSPITAL | Age: 68
Discharge: HOME | End: 2023-10-28
Payer: COMMERCIAL

## 2023-10-28 DIAGNOSIS — F17.210 CIGARETTE NICOTINE DEPENDENCE WITHOUT COMPLICATION: ICD-10-CM

## 2023-10-28 DIAGNOSIS — Z13.6 ENCOUNTER FOR ABDOMINAL AORTIC ANEURYSM (AAA) SCREENING: ICD-10-CM

## 2023-10-28 PROCEDURE — 76706 US ABDL AORTA SCREEN AAA: CPT

## 2023-10-28 PROCEDURE — 71271 CT THORAX LUNG CANCER SCR C-: CPT

## 2023-11-07 ENCOUNTER — TELEPHONE (OUTPATIENT)
Dept: FAMILY MEDICINE CLINIC | Facility: CLINIC | Age: 68
End: 2023-11-07

## 2023-11-07 NOTE — TELEPHONE ENCOUNTER
I spoke with patient about his CT results and he was asking if you could review his Ultrasound as well.  Please review and advise

## 2023-11-07 NOTE — TELEPHONE ENCOUNTER
Lungs were clear - see other message  There is a mild dilation of the aorta.  Would recommend rechecking u/s in 1 year

## 2023-11-28 ENCOUNTER — OFFICE VISIT (OUTPATIENT)
Dept: OBGYN CLINIC | Facility: CLINIC | Age: 68
End: 2023-11-28
Payer: COMMERCIAL

## 2023-11-28 ENCOUNTER — APPOINTMENT (OUTPATIENT)
Dept: RADIOLOGY | Facility: CLINIC | Age: 68
End: 2023-11-28
Payer: COMMERCIAL

## 2023-11-28 VITALS
WEIGHT: 213.8 LBS | BODY MASS INDEX: 33.56 KG/M2 | DIASTOLIC BLOOD PRESSURE: 91 MMHG | HEIGHT: 67 IN | HEART RATE: 97 BPM | SYSTOLIC BLOOD PRESSURE: 143 MMHG

## 2023-11-28 DIAGNOSIS — M25.561 CHRONIC PAIN OF RIGHT KNEE: ICD-10-CM

## 2023-11-28 DIAGNOSIS — M17.11 PRIMARY OSTEOARTHRITIS OF RIGHT KNEE: ICD-10-CM

## 2023-11-28 DIAGNOSIS — Z87.81 HISTORY OF FEMUR FRACTURE: ICD-10-CM

## 2023-11-28 DIAGNOSIS — G89.29 CHRONIC PAIN OF RIGHT KNEE: ICD-10-CM

## 2023-11-28 DIAGNOSIS — M17.31 POST-TRAUMATIC OSTEOARTHRITIS OF RIGHT KNEE: Primary | ICD-10-CM

## 2023-11-28 PROCEDURE — 99203 OFFICE O/P NEW LOW 30 MIN: CPT | Performed by: ORTHOPAEDIC SURGERY

## 2023-11-28 PROCEDURE — 73564 X-RAY EXAM KNEE 4 OR MORE: CPT

## 2023-11-28 PROCEDURE — 73552 X-RAY EXAM OF FEMUR 2/>: CPT

## 2023-11-28 PROCEDURE — 73560 X-RAY EXAM OF KNEE 1 OR 2: CPT
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
## 2023-12-05 ENCOUNTER — CONSULT (OUTPATIENT)
Dept: OBGYN CLINIC | Facility: CLINIC | Age: 68
End: 2023-12-05
Payer: COMMERCIAL

## 2023-12-05 VITALS — WEIGHT: 212 LBS | BODY MASS INDEX: 33.2 KG/M2

## 2023-12-05 DIAGNOSIS — Z01.818 PREOP TESTING: Primary | ICD-10-CM

## 2023-12-05 DIAGNOSIS — M17.31 POST-TRAUMATIC OSTEOARTHRITIS OF RIGHT KNEE: ICD-10-CM

## 2023-12-05 DIAGNOSIS — M25.561 CHRONIC PAIN OF RIGHT KNEE: ICD-10-CM

## 2023-12-05 DIAGNOSIS — G89.29 CHRONIC PAIN OF RIGHT KNEE: ICD-10-CM

## 2023-12-05 PROCEDURE — 99214 OFFICE O/P EST MOD 30 MIN: CPT | Performed by: ORTHOPAEDIC SURGERY

## 2023-12-05 RX ORDER — FERROUS SULFATE 324(65)MG
324 TABLET, DELAYED RELEASE (ENTERIC COATED) ORAL
Qty: 60 TABLET | Refills: 1 | Status: SHIPPED | OUTPATIENT
Start: 2023-12-05

## 2023-12-05 RX ORDER — ASCORBIC ACID 500 MG
500 TABLET ORAL 2 TIMES DAILY
Qty: 60 TABLET | Refills: 1 | Status: SHIPPED | OUTPATIENT
Start: 2023-12-05

## 2023-12-05 RX ORDER — FOLIC ACID 1 MG/1
1 TABLET ORAL DAILY
Qty: 30 TABLET | Refills: 1 | Status: SHIPPED | OUTPATIENT
Start: 2023-12-05

## 2023-12-05 RX ORDER — CHLORHEXIDINE GLUCONATE 4 G/100ML
SOLUTION TOPICAL DAILY PRN
OUTPATIENT
Start: 2023-12-05

## 2023-12-05 RX ORDER — SODIUM CHLORIDE, SODIUM LACTATE, POTASSIUM CHLORIDE, CALCIUM CHLORIDE 600; 310; 30; 20 MG/100ML; MG/100ML; MG/100ML; MG/100ML
125 INJECTION, SOLUTION INTRAVENOUS CONTINUOUS
OUTPATIENT
Start: 2023-12-05

## 2023-12-05 RX ORDER — ACETAMINOPHEN 325 MG/1
975 TABLET ORAL ONCE
OUTPATIENT
Start: 2023-12-05 | End: 2023-12-05

## 2023-12-05 RX ORDER — CHLORHEXIDINE GLUCONATE ORAL RINSE 1.2 MG/ML
15 SOLUTION DENTAL ONCE
OUTPATIENT
Start: 2023-12-05 | End: 2023-12-05

## 2023-12-05 NOTE — PROGRESS NOTES
Orthopedics          Oleg Ramon 79 y.o. male MRN: [id number]      Chief Complaint:   right knee pain    HPI:   79 y.o.male complaining of right knee pain. He presents to the office today regarding right knee pain. Patient has been diagnosed with right knee pain due to osteoarthritis. States his pain has been present for several years she states not having any injections over the past 5 years time he has had some pain relief with steroid injections for approximate 2 to 3 weeks however he soon stopped as the injection not significantly alleviated his pain. Patient states the pain is aching nature worse weightbearing mildly better with rest he also complains of decreased motion of his right knee as compared to his left. Pain is worse with walking for long periods of time. Patient does have a history of right femoral shaft fracture with intramedullary nail fixation in 1982 as well as subsequent hardware removal.  Patient also had arthroscopic intervention in his right knee greater than 20 years ago.   Denies any instability his right knee denies any radiation pain denies any changes in numbness ting right lower extremity                Review Of Systems:   Skin: Normal  Neuro: See HPI  Musculoskeletal: See HPI  All other systems reviewed and are negative    Past Medical History:   Past Medical History:   Diagnosis Date    COPD (chronic obstructive pulmonary disease) ([facility])     PMR (polymyalgia rheumatica) ([facility])     Spinal arthritis        Past Surgical History:   Past Surgical History:   Procedure Laterality Date    ANKLE SURGERY      KNEE SURGERY Right 1998    scope       Family History:  Family history reviewed and non-contributory  Family History   Problem Relation Age of Onset    Breast cancer Mother     Diabetes Father     Esophageal cancer Father          Social History:  Social History     Socioeconomic History    Marital status: /Civil Union     Spouse name: None    Number of children: None Years of education: None    Highest education level: None   Occupational History    None   Tobacco Use    Smoking status: Every Day     Packs/day: 0.50     Years: 40.00     Total pack years: 20.00     Types: Cigarettes     Start date: 67    Smokeless tobacco: Never   Vaping Use    Vaping Use: Never used   Substance and Sexual Activity    Alcohol use: Yes     Alcohol/week: 13.0 standard drinks of alcohol     Types: 12 Cans of beer, 1 Shots of liquor per week    Drug use: Never    Sexual activity: None   Other Topics Concern    None   Social History Narrative    None     Social Determinants of Health     Financial Resource Strain: Low Risk  (10/19/2023)    Overall Financial Resource Strain (CARDIA)     Difficulty of Paying Living Expenses: Not very hard   Food Insecurity: Not on file   Transportation Needs: No Transportation Needs (10/19/2023)    PRAPARE - Transportation     Lack of Transportation (Medical): No     Lack of Transportation (Non-Medical): No   Physical Activity: Not on file   Stress: Not on file   Social Connections: Not on file   Intimate Partner Violence: Not on file   Housing Stability: Not on file       Allergies:   No Known Allergies    Labs:  0   Lab Value Date/Time    HCT 49.6 (H) 10/06/2022 1055    HCT 48.6 07/02/2021 1346    HGB 16.3 10/06/2022 1055    HGB 15.9 07/02/2021 1346    WBC 9.35 10/06/2022 1055    WBC 9.06 07/02/2021 1346    ESR 17 10/07/2021 1231    CRP 4.1 (H) 10/07/2021 1231       Meds:    Current Outpatient Medications:     acetaminophen (TYLENOL) 500 mg tablet, Take 500 mg by mouth every 6 (six) hours as needed for mild pain, Disp: , Rfl:     sildenafil (VIAGRA) 50 MG tablet, Take 1 tablet (50 mg total) by mouth daily as needed for erectile dysfunction, Disp: 10 tablet, Rfl: 5    tamsulosin (FLOMAX) 0.4 mg, Take 1 capsule (0.4 mg total) by mouth daily with dinner, Disp: 90 capsule, Rfl: 3    Body mass index is 33.2 kg/m².   Wt Readings from Last 3 Encounters:   12/05/23 96.2 kg (212 lb)   11/28/23 97 kg (213 lb 12.8 oz)   10/19/23 97 kg (213 lb 12.8 oz)     Physical Exam:   There were no vitals filed for this visit. General Appearance:    Alert, cooperative, no distress, appears stated age   Head:    Normocephalic, without obvious abnormality, atraumatic   Eyes:    conjunctiva/corneas clear, both eyes        Nose:   Nares normal, septum midline, no drainage    Throat:   Lips normal; teeth and gums normal   Neck:    symmetrical, trachea midline, ;     thyroid:  no enlargement/   Back:     Symmetric, no curvature, ROM normal   Lungs:   No audible wheezing or labored breathing   Chest Wall:    No tenderness or deformity    Heart:    Regular rate and rhythm               Pulses:   2+ and symmetric all extremities   Skin:   Skin color, texture, turgor normal, no rashes or lesions   Neurologic:   normal strength, sensation and reflexes     throughout       Musculoskeletal: right lower extremity  Examination patient's right lower extremity no effusion no erythema range of motion is 15 degrees to 90 degrees quadricep hamstring strength 5 out of 5. Stable to varus and valgus stress pain to palpation medial lateral joint line active ankle dorsi plantarflexion strength 5 out of 5 negative modified straight leg raise  On examination of the left knee there is no effusion no erythema no laceration no abrasion no ecchymosis. Range of motion full active extension flexion greater than 120°. There is no pain on palpation medial and lateral joint lines, pes anserine bursa region, distal iliotibial band. No pain or laxity with varus or valgus stress. Quadriceps, hamstring strength 5/5. Sensation intact, distal pulses present. Radiology:   I personally reviewed the films.   X-rays right knee shows severe medial joint space narrowing subchondral sclerosis osteophyte formation and deformity as well as healed fracture noted of the femoral shaft.    _*_*_*_*_*_*_*_*_*_*_*_*_*_*_*_*_*_*_*_*_*_*_*_*_*_*_*_*_*_*_*_*_*_*_*_*_*_*_*_*_*    Assessment:  68 y.o.male with right knee pain due to arthritis. Patient with history of removal of hardware s/p IM nailing of femoral shaft fracture    Plan:   Weight bearing as tolerated right lower extremity  Patient is a candidate for right total knee arthroplasty  The benefits and purpose of the operations and/or procedure have been explained to me in language I understand by Dr. Ricardo March well as the risks and benefits, alternatives and complications pertaining to the above procedure/surgery which include but is not limited to: infections, deeps vein thrombosis, blood clots to the lungs, wound healing problems, complications from extensive blood loss, including shock, possible death, continued pain, fracture, vascular or nerve injury, potential need for further surgery/revision, persistent pain/stiffness/instability, failure of hardware,heart attack, stroke and not obtaining results patient used.   Medical and pulmonary clearance ordered  CT scan ordered for [facility] preoperative planning for patient's right knee  Aspirin 81 mg twice daily x35 days from date of surgery for DVT prophylaxis postoperatively  Same-day surgery candidate

## 2023-12-14 ENCOUNTER — TELEPHONE (OUTPATIENT)
Dept: OBGYN CLINIC | Facility: MEDICAL CENTER | Age: 68
End: 2023-12-14

## 2023-12-14 NOTE — TELEPHONE ENCOUNTER
Caller: Jearlean Merlin     Doctor: Yash Parra    Reason for call: Calling from Dental office Dr Surinder Dale. Asking if patient will have to pre medicate prior to his surgery on 2/22/24 TKA. Jearlean Merlin was asking to be sure no pre meds are needed at this time, as the patient is in the chair. I did transfer Bascom Eisenmenger to the [facility] due her needing an immediate answer.

## 2023-12-15 ENCOUNTER — HOSPITAL ENCOUNTER (OUTPATIENT)
Dept: CT IMAGING | Facility: HOSPITAL | Age: 68
End: 2023-12-15
Payer: COMMERCIAL

## 2023-12-15 ENCOUNTER — CONSULT (OUTPATIENT)
Dept: PULMONOLOGY | Facility: CLINIC | Age: 68
End: 2023-12-15
Payer: COMMERCIAL

## 2023-12-15 VITALS
HEART RATE: 117 BPM | BODY MASS INDEX: 32.8 KG/M2 | WEIGHT: 209.4 LBS | SYSTOLIC BLOOD PRESSURE: 110 MMHG | DIASTOLIC BLOOD PRESSURE: 76 MMHG | TEMPERATURE: 97 F | OXYGEN SATURATION: 95 %

## 2023-12-15 DIAGNOSIS — R06.09 DYSPNEA ON EXERTION: ICD-10-CM

## 2023-12-15 DIAGNOSIS — R05.3 CHRONIC COUGH: ICD-10-CM

## 2023-12-15 DIAGNOSIS — M17.31 POST-TRAUMATIC OSTEOARTHRITIS OF RIGHT KNEE: ICD-10-CM

## 2023-12-15 DIAGNOSIS — J44.9 CHRONIC OBSTRUCTIVE PULMONARY DISEASE, UNSPECIFIED COPD TYPE (HCC): Primary | ICD-10-CM

## 2023-12-15 DIAGNOSIS — Z01.818 PREOP TESTING: ICD-10-CM

## 2023-12-15 PROCEDURE — 99205 OFFICE O/P NEW HI 60 MIN: CPT | Performed by: INTERNAL MEDICINE

## 2023-12-15 PROCEDURE — G1004 CDSM NDSC: HCPCS

## 2023-12-15 PROCEDURE — 73700 CT LOWER EXTREMITY W/O DYE: CPT

## 2023-12-15 PROCEDURE — 94010 BREATHING CAPACITY TEST: CPT | Performed by: INTERNAL MEDICINE

## 2023-12-15 PROCEDURE — 94618 PULMONARY STRESS TESTING: CPT | Performed by: INTERNAL MEDICINE

## 2023-12-15 PROCEDURE — 99406 BEHAV CHNG SMOKING 3-10 MIN: CPT | Performed by: INTERNAL MEDICINE

## 2023-12-15 RX ORDER — IPRATROPIUM BROMIDE AND ALBUTEROL SULFATE 2.5; .5 MG/3ML; MG/3ML
3 SOLUTION RESPIRATORY (INHALATION) 4 TIMES DAILY
Qty: 180 ML | Refills: 0 | Status: SHIPPED | OUTPATIENT
Start: 2023-12-15

## 2023-12-15 RX ORDER — TIOTROPIUM BROMIDE AND OLODATEROL 3.124; 2.736 UG/1; UG/1
2 SPRAY, METERED RESPIRATORY (INHALATION) DAILY
Qty: 12 G | Refills: 2 | Status: SHIPPED | OUTPATIENT
Start: 2023-12-15

## 2023-12-15 NOTE — PROGRESS NOTES
Consultation - Pulmonary Medicine   Kingston Davis 79 y.o. male MRN: [id number]    Physician Requesting Consult: Edin Crump  Reason for Consult: pre-op clearance    Norman Ceballos is a 79 y.o. male current smoker 25PY, hx ?COPD, insomnia, chronic who presents for pre-op eval.     # Current Smoker  # ? COPD  # Pulmonary pre-op evaluation  # BLANC  # Chronic cough  Pt > 30PY smoker, currently smoking. Has chronic BLANC and chronic cough, poor exercise tolerance (100ft) due to combination of respiratory symptoms, deconditioning, and knee pain. Lung Ca screening this year with no suspicious nodules, CT with clear lungs  Needs to stop smoking as main treatment for COPD, but should also be started on inhalers (LAMA/LABA). Eos 370. Based on ARISCAT score, pt is at low risk of pulmonary complications but based on clinical history and exam and severe COPD pt is at increased risk of pulmonary complications. No pulmonary contraindications to procedure. Pt will be medically optimized with starting inhalers as below and working on smoking cessation    - counselled on smoking cessation for 5 min, pt cutting down but not ready to quit  - spirometry with severe obstruction (FEV1 32%)  - 6 min walk no desat (lowest 92%)  - inhalers: Start LAMA/LABA (stiolto)  --- can consider adding ICS in future if no improvement in symptoms  - start duonebs prn  - prescribe nebulizer  - continue annual lung Ca screening  Follow up in 3 months    # ? JOANIE  Reports poor sleep due ot night sifts.  No snoring or daytime sleepiness  - home sleep study ordered, declined to do the study    Vaccines    Immunization History   Administered Date(s) Administered    COVID-19 MODERNA VACC 0.5 ML IM 03/24/2021, 04/21/2021    H1N1, All Formulations 11/04/2009    Influenza Quadrivalent Preservative Free 3 years and older IM 10/04/2018    Influenza, high dose seasonal 0.7 mL 10/05/2022, 10/19/2023    Pneumococcal Conjugate Vaccine 20-valent (Pcv20), Polysace 10/05/2022        I have spent a total time of 55 minutes on 12/15/23 in caring for this patient including Diagnostic results, Prognosis, Risks and benefits of tx options, Instructions for management, Patient and family education, Importance of tx compliance, Risk factor reductions, Impressions, Counseling / Coordination of care, Documenting in the medical record, Reviewing / ordering tests, medicine, procedures  , and Obtaining or reviewing history  . ______________________________________________________________________    HPI:    Michele Larson is a 79 y.o. male current smoker 25PY, hx ?COPD, insomnia, chronic who presents for pre-op eval.     >30 PY smoker, currently smoking 5 cig/day  Cutting down from 1PPD    No exacerbation in the last year, last in 2019 but has used steroids before for chronic pain  SOB with exertion, chronic, walks 100 steps and 1 flight of stairs  Chronic productive cough  Previously on dulera 6 years ago, now not taking any inhalers    No prior PFTs      Review of Systems:  Review of Systems  Aside from what is mentioned in the HPI, the review of systems otherwise negative.     Current Medications:    Current Outpatient Medications:     ascorbic acid (VITAMIN C) 500 MG tablet, Take 1 tablet (500 mg total) by mouth 2 (two) times a day, Disp: 60 tablet, Rfl: 1    ferrous sulfate 324 (65 Fe) mg, Take 1 tablet (324 mg total) by mouth 2 (two) times a day before meals, Disp: 60 tablet, Rfl: 1    folic acid (FOLVITE) 1 mg tablet, Take 1 tablet (1 mg total) by mouth daily, Disp: 30 tablet, Rfl: 1    sildenafil (VIAGRA) 50 MG tablet, Take 1 tablet (50 mg total) by mouth daily as needed for erectile dysfunction, Disp: 10 tablet, Rfl: 5    tamsulosin (FLOMAX) 0.4 mg, Take 1 capsule (0.4 mg total) by mouth daily with dinner, Disp: 90 capsule, Rfl: 3    acetaminophen (TYLENOL) 500 mg tablet, Take 500 mg by mouth every 6 (six) hours as needed for mild pain (Patient not taking: Reported on 12/15/2023), Disp: , Rfl:     Historical Information   Past Medical History:   Diagnosis Date    COPD (chronic obstructive pulmonary disease) ([facility])     PMR (polymyalgia rheumatica) ([facility])     Spinal arthritis      Past Surgical History:   Procedure Laterality Date    ANKLE SURGERY      KNEE SURGERY Right 1998    scope     Social History   Social History     Tobacco Use   Smoking Status Every Day    Current packs/day: 0.50    Average packs/day: 0.5 packs/day for 49.0 years (24.5 ttl pk-yrs)    Types: Cigarettes    Start date: 1975   Smokeless Tobacco Never       Family History:   Family History   Problem Relation Age of Onset    Breast cancer Mother     Diabetes Father     Esophageal cancer Father          PhysicalExamination:  Vitals:   /76   Pulse (!) 117   Temp (!) 97 °F (36.1 °C)   Wt 95 kg (209 lb 6.4 oz)   SpO2 95%   BMI 32.80 kg/m²     Appearance -- NAD, speaking full sentences  HEENT -- anicteric sclera, clear OP, MMM  Neck -- no JVD  Heart -- RRR, no murmurs  Lungs -- distant breath sounds, decreased air movement, no wheezing  Abdomen -- soft, NTND, +bs  Extremities -- WWP, no LE edema  Skin -- no rash  Neuro -- A&Ox3, wnl  Psych -- no obvious depression or hallucination        Diagnostic Data:  Labs: I personally reviewed the most recent laboratory data pertinent to today's visit    Lab Results   Component Value Date    WBC 9.35 10/06/2022    HGB 16.3 10/06/2022    HCT 49.6 (H) 10/06/2022    MCV 94 10/06/2022     10/06/2022     Lab Results   Component Value Date    CALCIUM 9.1 10/20/2023    K 4.3 10/20/2023    CO2 30 10/20/2023     10/20/2023    BUN 12 10/20/2023    CREATININE 0.87 10/20/2023     No results found for: "IGE"  Lab Results   Component Value Date    ALT 30 10/06/2022    AST 14 10/06/2022    ALKPHOS 96 10/06/2022       PFT results: The most recent pulmonary function tests were reviewed.   Spirometry:  Severe obstruction FEV 1 32%  6 min walk: lowest sat 92%, does not meet criteria for oxygen    Imaging:  I personally reviewed the images on the [address] system pertinent to today's visit  CT Chest 10/2023: Lungs are clear. There is no tracheal or endobronchial lesion. Stable mediastinal lipomatosis. No adenopathy.          Fouzia Moreno MD  [facility]

## 2023-12-16 NOTE — TELEPHONE ENCOUNTER
Caller: Patient- [facility]     Doctor: Dr Guerrero Hinson    Reason for call: Patient is calling back in from a missed call he stated that his phone only rang once and no message was left. Patient stated that he had already had his dental cleaning but lamar shave another appointment upcoming in June. He also wanted to know as to how long after his surgery will he need to wait until he is able to drive again, please advise.         Call back# [phone]

## 2023-12-18 NOTE — TELEPHONE ENCOUNTER
Caller: Patient     Doctor: Jessica    Reason for call: Patient returning call please call back     Call back#: [phone]

## 2023-12-22 ENCOUNTER — TELEPHONE (OUTPATIENT)
Dept: OBGYN CLINIC | Facility: CLINIC | Age: 68
End: 2023-12-22

## 2023-12-22 NOTE — TELEPHONE ENCOUNTER
Pt called asking if someone from the office called him 2 days ago for anything.  He received a message but deleted it.  Explained that there is no record of anyone calling him and leaving a message.  Pt verbalized understanding.

## 2023-12-28 DIAGNOSIS — M17.31 POST-TRAUMATIC OSTEOARTHRITIS OF RIGHT KNEE: ICD-10-CM

## 2023-12-28 DIAGNOSIS — Z01.818 PREOP TESTING: ICD-10-CM

## 2023-12-28 RX ORDER — FOLIC ACID 1 MG/1
1000 TABLET ORAL DAILY
Qty: 90 TABLET | Refills: 1 | Status: SHIPPED | OUTPATIENT
Start: 2023-12-28

## 2023-12-28 RX ORDER — FERROUS SULFATE 324(65)MG
324 TABLET, DELAYED RELEASE (ENTERIC COATED) ORAL
Qty: 180 TABLET | Refills: 1 | Status: SHIPPED | OUTPATIENT
Start: 2023-12-28

## 2024-01-11 ENCOUNTER — TELEPHONE (OUTPATIENT)
Age: 69
End: 2024-01-11

## 2024-01-11 NOTE — TELEPHONE ENCOUNTER
Patient called in stating that he never received his nebulizer order and would like a call back with an update. She said if he does not answer to leave a detailed voicemail with all the information. Please advise.

## 2024-01-12 LAB
DME PARACHUTE DELIVERY DATE REQUESTED: NORMAL
DME PARACHUTE ITEM DESCRIPTION: NORMAL
DME PARACHUTE ORDER STATUS: NORMAL
DME PARACHUTE SUPPLIER NAME: NORMAL
DME PARACHUTE SUPPLIER PHONE: NORMAL

## 2024-01-15 ENCOUNTER — TELEPHONE (OUTPATIENT)
Dept: OBGYN CLINIC | Facility: HOSPITAL | Age: 69
End: 2024-01-15

## 2024-01-15 DIAGNOSIS — M17.31 POST-TRAUMATIC OSTEOARTHRITIS OF RIGHT KNEE: Primary | ICD-10-CM

## 2024-01-15 NOTE — TELEPHONE ENCOUNTER
"Preoperative Elective Admission Assessment- spoke to patient     Living Situation:    Who does pt live with: lives alone  What kind of home: ranch  How do they enter the home: front  How many levels in home: 1 level home   # of steps to enter home: 3 to enter   # of steps to second floor: N/A   Are there handrails: Yes  Are there landings: No  Sleeping arrangement: first/entry floor  Where is Bathroom: Step in tub on first floor with grab bar     First Floor Setup:   Is there a bathroom: Yes  Where would pt sleep: bed     DME: rolling walker and cane    We discussed clearing pathways in the home and making sure there is accessibly to use the walker, for example, removing throw rugs.      Patient's Current Level of Function: Ambulates: Independently and ADLs: Independent    Post-op Caregiver:  Ex wife - they are . Pt plans to stay at her home for 1 week. She has a one level apartment, no elevator, on the 3rd floor.      About 30 steps today, with railing and landings in between.     Currently receive any HHC/aides/community supports: No     Post-op Transport: significant other  To/from hospital: Friend, or ex s/o  To/from PT 2-3x/week:   Uses community transport now: No     Outpatient Physical Therapy Site:  Site: Out of network- recommended 2/26 appt.   pre and post-op appts scheduled? No- advised patient to schedule     Medication Management: self  Preferred Pharmacy for Post-op Medications: [id number] - [place], [place] - [address] [[id number]] - SO EX WIFE CAN   Blood Management Vitamin Regimen: Pt confirms taking as prescribed  Post-op anticoagulant: to be determined by surgical team postoperatively     DC Plan: Pt plans to be discharged home    Barriers to DC identified preoperatively: Pt concerned with EX s/o support, states \"she changes her mind day of\" and being \"nervous.\" Would like to discuss potential resources for back up plans.     Also being Right Leg, concerns with driving, " unable to have support to PT for     Pt's X lives in [place], would stay here for 3-5 days, then go back to [facility], and be alone.     BMI: 32.80    Patient Education:  Pt educated on post-op pain, early mobilization (POD0), LOS goals, OP PT goals, and preoperative bathing. Patient educated that our goal is to appropriately discharge patient based off their post-op function while striving to maintain maximal independence. The goal is to discharge patient to home and for them to attend outpatient physical therapy.    Assigned to care team? Yes

## 2024-01-16 ENCOUNTER — PATIENT OUTREACH (OUTPATIENT)
Dept: OBGYN CLINIC | Facility: HOSPITAL | Age: 69
End: 2024-01-16

## 2024-01-16 DIAGNOSIS — Z74.8 ASSISTANCE WITH TRANSPORTATION: Primary | ICD-10-CM

## 2024-01-16 NOTE — PROGRESS NOTES
[facility] received a new referral in regard to pt scheduled for arthroplasty of right knee on 02/22/2024. [facility] reviewed NN note prior to contacting pt. Pt lives alone in a ranch style home. Pt ambulates independently and is independent with ADLs. Pts post-op caregiver will be his ex wife ( they are ). Pt plans to stay at her home 1 week. She has a one level apartment on the third floor, no elevator. Pts ex wife or friend will be taking pt to and from surgery. Pt significant other will be taking pt home from surgery. Pt has no planned transportation to and from OP PT. Pt did express he would like to discuss potential resources for caregiver support as his ex wife is nervous.   I contacted pt and introduced self and role. Pt did confirm his DC plan is to stay with his wife who he is  from. ( Pt clarified that it is still his wife, they just are unable to live together) Pt did request additional HHC support options while speaking to NN. Today [facility] offered self pay HHC support and per pt he is on a tight budget and unable to afford. He did say today his wife offered for him to stay as long as needed. Pt will keep the caregiver support plan of his wife. He will stay in [place] with her for 1 -2 weeks and then return home to the [facility]. Pts wife will be taking pt to and from surgery. Pt is concerned about transportation to OP PT once at home. SW and pt discussed the [place] [facility] as an option and pt is in agreement. [facility] will refer to [facility] to assist with application porcess. [facility] inquire if pt is interested on MOWS and he is, [facility] will make referral. [facility] will remain available to assist pt.   MARTIN contacted [facility] @ [phone] and made referral for pt.

## 2024-01-19 LAB
DME PARACHUTE DELIVERY DATE ACTUAL: NORMAL
DME PARACHUTE DELIVERY DATE REQUESTED: NORMAL
DME PARACHUTE ITEM DESCRIPTION: NORMAL
DME PARACHUTE ORDER STATUS: NORMAL
DME PARACHUTE SUPPLIER NAME: NORMAL
DME PARACHUTE SUPPLIER PHONE: NORMAL

## 2024-01-22 DIAGNOSIS — J44.9 CHRONIC OBSTRUCTIVE PULMONARY DISEASE, UNSPECIFIED COPD TYPE (HCC): ICD-10-CM

## 2024-01-23 ENCOUNTER — TELEPHONE (OUTPATIENT)
Age: 69
End: 2024-01-23

## 2024-01-23 DIAGNOSIS — Z71.6 ENCOUNTER FOR SMOKING CESSATION COUNSELING: Primary | ICD-10-CM

## 2024-01-23 DIAGNOSIS — J44.9 CHRONIC OBSTRUCTIVE PULMONARY DISEASE, UNSPECIFIED COPD TYPE (HCC): ICD-10-CM

## 2024-01-23 RX ORDER — IPRATROPIUM BROMIDE AND ALBUTEROL SULFATE 2.5; .5 MG/3ML; MG/3ML
3 SOLUTION RESPIRATORY (INHALATION) 4 TIMES DAILY
Qty: 180 ML | Refills: 5 | Status: SHIPPED | OUTPATIENT
Start: 2024-01-23

## 2024-01-23 NOTE — TELEPHONE ENCOUNTER
PA for Stiolto    Submitted via  [x]CMM-KEY [id number]  []Surescripts-Case ID #    []Faxed to plan   []Other website    []Phone call Case ID #      Office notes sent, clinical questions answered. Awaiting determination

## 2024-01-23 NOTE — TELEPHONE ENCOUNTER
Refill must be reviewed and completed by the office or provider. The refill is unable to be approved by the medication management team.    Request for alternative, sent to prior auth team for review in a separate encounter. Please refuse accordingly.

## 2024-01-24 ENCOUNTER — PATIENT OUTREACH (OUTPATIENT)
Dept: OBGYN CLINIC | Facility: HOSPITAL | Age: 69
End: 2024-01-24

## 2024-01-24 ENCOUNTER — OFFICE VISIT (OUTPATIENT)
Dept: LAB | Facility: HOSPITAL | Age: 69
End: 2024-01-24
Payer: COMMERCIAL

## 2024-01-24 ENCOUNTER — PATIENT OUTREACH (OUTPATIENT)
Dept: OTHER | Facility: CLINIC | Age: 69
End: 2024-01-24

## 2024-01-24 ENCOUNTER — LAB REQUISITION (OUTPATIENT)
Dept: LAB | Facility: HOSPITAL | Age: 69
End: 2024-01-24
Payer: COMMERCIAL

## 2024-01-24 ENCOUNTER — APPOINTMENT (OUTPATIENT)
Dept: LAB | Facility: HOSPITAL | Age: 69
End: 2024-01-24
Payer: COMMERCIAL

## 2024-01-24 DIAGNOSIS — Z01.818 ENCOUNTER FOR OTHER PREPROCEDURAL EXAMINATION: ICD-10-CM

## 2024-01-24 DIAGNOSIS — M17.31 POST-TRAUMATIC OSTEOARTHRITIS OF RIGHT KNEE: ICD-10-CM

## 2024-01-24 DIAGNOSIS — Z01.818 OTHER SPECIFIED PRE-OPERATIVE EXAMINATION: ICD-10-CM

## 2024-01-24 DIAGNOSIS — Z01.818 PREOP TESTING: ICD-10-CM

## 2024-01-24 LAB
ALBUMIN SERPL BCP-MCNC: 4.3 G/DL (ref 3.5–5)
ALP SERPL-CCNC: 77 U/L (ref 34–104)
ALT SERPL W P-5'-P-CCNC: 21 U/L (ref 7–52)
ANION GAP SERPL CALCULATED.3IONS-SCNC: 9 MMOL/L
APTT PPP: 28 SECONDS (ref 23–37)
AST SERPL W P-5'-P-CCNC: 18 U/L (ref 13–39)
BASOPHILS # BLD AUTO: 0.05 THOUSANDS/ÂΜL (ref 0–0.1)
BASOPHILS NFR BLD AUTO: 1 % (ref 0–1)
BILIRUB SERPL-MCNC: 0.52 MG/DL (ref 0.2–1)
BUN SERPL-MCNC: 13 MG/DL (ref 5–25)
CALCIUM SERPL-MCNC: 9.5 MG/DL (ref 8.4–10.2)
CHLORIDE SERPL-SCNC: 102 MMOL/L (ref 96–108)
CO2 SERPL-SCNC: 29 MMOL/L (ref 21–32)
CREAT SERPL-MCNC: 1 MG/DL (ref 0.6–1.3)
EOSINOPHIL # BLD AUTO: 0.19 THOUSAND/ÂΜL (ref 0–0.61)
EOSINOPHIL NFR BLD AUTO: 2 % (ref 0–6)
ERYTHROCYTE [DISTWIDTH] IN BLOOD BY AUTOMATED COUNT: 13.2 % (ref 11.6–15.1)
EST. AVERAGE GLUCOSE BLD GHB EST-MCNC: 111 MG/DL
GFR SERPL CREATININE-BSD FRML MDRD: 78 ML/MIN/1.73SQ M
GLUCOSE SERPL-MCNC: 97 MG/DL (ref 65–140)
HBA1C MFR BLD: 5.5 %
HCT VFR BLD AUTO: 48.3 % (ref 36.5–49.3)
HGB BLD-MCNC: 15.6 G/DL (ref 12–17)
IMM GRANULOCYTES # BLD AUTO: 0.02 THOUSAND/UL (ref 0–0.2)
IMM GRANULOCYTES NFR BLD AUTO: 0 % (ref 0–2)
INR PPP: 1.02 (ref 0.84–1.19)
LYMPHOCYTES # BLD AUTO: 2.51 THOUSANDS/ÂΜL (ref 0.6–4.47)
LYMPHOCYTES NFR BLD AUTO: 30 % (ref 14–44)
MCH RBC QN AUTO: 30.2 PG (ref 26.8–34.3)
MCHC RBC AUTO-ENTMCNC: 32.3 G/DL (ref 31.4–37.4)
MCV RBC AUTO: 93 FL (ref 82–98)
MONOCYTES # BLD AUTO: 0.63 THOUSAND/ÂΜL (ref 0.17–1.22)
MONOCYTES NFR BLD AUTO: 8 % (ref 4–12)
NEUTROPHILS # BLD AUTO: 5.04 THOUSANDS/ÂΜL (ref 1.85–7.62)
NEUTS SEG NFR BLD AUTO: 59 % (ref 43–75)
NRBC BLD AUTO-RTO: 0 /100 WBCS
PLATELET # BLD AUTO: 260 THOUSANDS/UL (ref 149–390)
PMV BLD AUTO: 8.7 FL (ref 8.9–12.7)
POTASSIUM SERPL-SCNC: 3.9 MMOL/L (ref 3.5–5.3)
PROT SERPL-MCNC: 7.5 G/DL (ref 6.4–8.4)
PROTHROMBIN TIME: 13.3 SECONDS (ref 11.6–14.5)
RBC # BLD AUTO: 5.17 MILLION/UL (ref 3.88–5.62)
SODIUM SERPL-SCNC: 140 MMOL/L (ref 135–147)
WBC # BLD AUTO: 8.44 THOUSAND/UL (ref 4.31–10.16)

## 2024-01-24 PROCEDURE — 85730 THROMBOPLASTIN TIME PARTIAL: CPT

## 2024-01-24 PROCEDURE — 86901 BLOOD TYPING SEROLOGIC RH(D): CPT | Performed by: PHYSICIAN ASSISTANT

## 2024-01-24 PROCEDURE — 36415 COLL VENOUS BLD VENIPUNCTURE: CPT

## 2024-01-24 PROCEDURE — 85025 COMPLETE CBC W/AUTO DIFF WBC: CPT

## 2024-01-24 PROCEDURE — 80053 COMPREHEN METABOLIC PANEL: CPT

## 2024-01-24 PROCEDURE — 83036 HEMOGLOBIN GLYCOSYLATED A1C: CPT

## 2024-01-24 PROCEDURE — 86900 BLOOD TYPING SEROLOGIC ABO: CPT | Performed by: PHYSICIAN ASSISTANT

## 2024-01-24 PROCEDURE — 86850 RBC ANTIBODY SCREEN: CPT | Performed by: PHYSICIAN ASSISTANT

## 2024-01-24 PROCEDURE — 85610 PROTHROMBIN TIME: CPT

## 2024-01-24 PROCEDURE — 93005 ELECTROCARDIOGRAM TRACING: CPT

## 2024-01-24 RX ORDER — TIOTROPIUM BROMIDE AND OLODATEROL 3.124; 2.736 UG/1; UG/1
2 SPRAY, METERED RESPIRATORY (INHALATION) DAILY
Qty: 12 G | Refills: 2 | Status: SHIPPED | OUTPATIENT
Start: 2024-01-24

## 2024-01-24 NOTE — PROGRESS NOTES
[facility] contacted pt today to follow up in regard to transportation and caregiver support. Pt plans to stay with his ex wife postoperatively at least one week. She will also be taking pt to and from surgery. Pt was concerned about transportation to and from OP PT. Pt was referred to [facility] to assist with application. Pt and [facility] have not been in contact yet. [facility] will message [facility] today. Pt also shared he was contacted by [employer] and he is deciding if he wants their services. Pt reports no other needs at this time, [facility] will remain available and continue to support pt.

## 2024-01-24 NOTE — PROGRESS NOTES
[facility] left pt and pt ex spouse a message to have pt call [facility] regarding getting assistance with transportation. [facility] sent pt an email as well that [facility] will need him to answer some questions for the application to be submitted for [facility].    Will F/U by 1/26

## 2024-01-25 ENCOUNTER — PATIENT OUTREACH (OUTPATIENT)
Dept: OBGYN CLINIC | Facility: HOSPITAL | Age: 69
End: 2024-01-25

## 2024-01-25 LAB
ABO GROUP BLD: NORMAL
BLD GP AB SCN SERPL QL: NEGATIVE
RH BLD: POSITIVE
SPECIMEN EXPIRATION DATE: NORMAL

## 2024-01-25 NOTE — PROGRESS NOTES
[facility] spoke with pt and completed Senior transportation application.Patient was referred on [employer] to [facility] [place] for transit  [facility] faxed pt completed application and license to [phone].  [facility]OC attached pt application to clinical import notes and media.    Will F/U by 1/30/24

## 2024-01-25 NOTE — PROGRESS NOTES
Pt sent [facility] a message to give him a call and that he received [facility] message about  he would need  help with getting transportation.    [facility] returned pt call and left pt a message again to return call.    Will F/U by 1/29

## 2024-01-26 LAB
ATRIAL RATE: 86 BPM
P AXIS: 53 DEGREES
PR INTERVAL: 228 MS
QRS AXIS: 94 DEGREES
QRSD INTERVAL: 134 MS
QT INTERVAL: 392 MS
QTC INTERVAL: 469 MS
T WAVE AXIS: 60 DEGREES
VENTRICULAR RATE: 86 BPM

## 2024-01-30 ENCOUNTER — PATIENT OUTREACH (OUTPATIENT)
Dept: OBGYN CLINIC | Facility: HOSPITAL | Age: 69
End: 2024-01-30

## 2024-01-30 ENCOUNTER — TELEPHONE (OUTPATIENT)
Age: 69
End: 2024-01-30

## 2024-01-30 PROBLEM — E66.9 OBESITY (BMI 30.0-34.9): Status: ACTIVE | Noted: 2024-01-30

## 2024-01-30 PROBLEM — E66.811 OBESITY (BMI 30.0-34.9): Status: ACTIVE | Noted: 2024-01-30
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
## 2024-01-30 NOTE — PATIENT INSTRUCTIONS
Contact surgical nurse  navigator with any questions regarding preoperative plan or schedule.  Stop all over the counter supplements, herbal, naturopathic  medications for 1 week prior to surgery UNLESS prescribed by your surgeon  Hold NSAIDS (i.e. advil, alleve, motrin, ibuprofen, celebrex) minimum 7 days prior to surgery  Hold Asprin minimum 7 days prior to surgery  Recommend using Tylenol ( acetaminophen ) 500mg every eight hours during the first week post discharge in conjunction with any additional pain medicine prescribed by your surgeon  Use bowel medicines prescribed by your surgeon ( colace) daily post op during the first 1-2 weeks to avoid post operative constipation issues  Call [phone] with any post discharge concerns or medical issues  The morning of surgery take only the following medication with small sip of water: none

## 2024-01-30 NOTE — PROGRESS NOTES
MARTINCM contacted pt today to follow up with DC plan. Pt plans to stay with his wife ( they live separate, but are still ) for a week to two. She is also taking pt to and from surgery. Pt has spoke to [employer] and pt was advised to contact them a week prior to surgery if he would like their service. Pt was also approved for [employer] to use to get to and from OP PT. No other needs noted at this time. MARTINCM will remain available.

## 2024-02-02 ENCOUNTER — OFFICE VISIT (OUTPATIENT)
Age: 69
End: 2024-02-02
Payer: COMMERCIAL

## 2024-02-02 VITALS
HEART RATE: 61 BPM | DIASTOLIC BLOOD PRESSURE: 88 MMHG | BODY MASS INDEX: 34.49 KG/M2 | HEIGHT: 66 IN | SYSTOLIC BLOOD PRESSURE: 126 MMHG | WEIGHT: 214.6 LBS

## 2024-02-02 DIAGNOSIS — N40.1 BPH ASSOCIATED WITH NOCTURIA: ICD-10-CM

## 2024-02-02 DIAGNOSIS — M17.31 POST-TRAUMATIC OSTEOARTHRITIS OF RIGHT KNEE: ICD-10-CM

## 2024-02-02 DIAGNOSIS — R35.1 BPH ASSOCIATED WITH NOCTURIA: ICD-10-CM

## 2024-02-02 DIAGNOSIS — Z01.818 PREOP TESTING: ICD-10-CM

## 2024-02-02 DIAGNOSIS — F17.210 CIGARETTE NICOTINE DEPENDENCE WITHOUT COMPLICATION: ICD-10-CM

## 2024-02-02 DIAGNOSIS — Z01.818 PREOPERATIVE CLEARANCE: Primary | ICD-10-CM

## 2024-02-02 DIAGNOSIS — J44.9 CHRONIC OBSTRUCTIVE PULMONARY DISEASE, UNSPECIFIED COPD TYPE (HCC): ICD-10-CM

## 2024-02-02 DIAGNOSIS — E66.9 OBESITY (BMI 30.0-34.9): ICD-10-CM

## 2024-02-02 DIAGNOSIS — M17.11 PRIMARY OSTEOARTHRITIS OF RIGHT KNEE: ICD-10-CM

## 2024-02-02 PROCEDURE — 99214 OFFICE O/P EST MOD 30 MIN: CPT | Performed by: INTERNAL MEDICINE

## 2024-02-02 RX ORDER — ASCORBIC ACID 500 MG
500 TABLET ORAL 2 TIMES DAILY
Qty: 60 TABLET | Refills: 1 | Status: SHIPPED | OUTPATIENT
Start: 2024-02-02

## 2024-02-07 ENCOUNTER — TELEPHONE (OUTPATIENT)
Age: 69
End: 2024-02-07

## 2024-02-09 ENCOUNTER — TELEPHONE (OUTPATIENT)
Dept: OBGYN CLINIC | Facility: CLINIC | Age: 69
End: 2024-02-09

## 2024-02-09 NOTE — TELEPHONE ENCOUNTER
"Pt called directly.    He states he would like to have the surgery and \"just come home\" and be alone postop. He feels he did a lot to have this surgery done, but unable to have wife care for him, as she is sick.     We discussed resources discussed by myself and DEMETRIUS Michelle (). He reports he has no help, no transport.     He called his insurance, and he has to pay $350 per day for SNF/REHAB if that is ordered, and that is of concern as well.     Pt states \"were just circling\" and not making progress.     He is aware I will notify the surgeon, as patient is unsure how to proceed at this point.   "

## 2024-02-09 NOTE — TELEPHONE ENCOUNTER
Pt called stating that he may need to cancel or postpone his surgery on 2/22/24.  States his ex-wife is very sick and she was to be his support help for after surgery.  He still has some things to consider and will get back to me with a definite decision by Monday, 2/12/24.
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
## 2024-02-15 ENCOUNTER — PATIENT OUTREACH (OUTPATIENT)
Dept: OBGYN CLINIC | Facility: HOSPITAL | Age: 69
End: 2024-02-15

## 2024-02-15 DIAGNOSIS — J44.9 CHRONIC OBSTRUCTIVE PULMONARY DISEASE, UNSPECIFIED COPD TYPE (HCC): ICD-10-CM

## 2024-02-15 RX ORDER — TIOTROPIUM BROMIDE AND OLODATEROL 3.124; 2.736 UG/1; UG/1
2 SPRAY, METERED RESPIRATORY (INHALATION) DAILY
Refills: 2 | Status: CANCELLED | OUTPATIENT
Start: 2024-02-15

## 2024-02-15 NOTE — PROGRESS NOTES
[facility] received a message from  that pt has some concerns about his current DC plan. [facility] attempted to reach pt yesterday 02/14/2024 and was unable, a message was left to please return CM call. This morning, [facility] returned pt call and was able to discuss pt concerns. Per pt his wife has been sick and he was concerned she would not be able to proceed with providing caregiver support. However, pt then shared that last night his wife said she would provide caregiver support. Per pt she is unreliable due to her liver issues and alcohol consumption so he is not comfortable using her as caregiver support. Pt not eligible for [facility] services as pt is independent at baseline. Pt also unable to afford self pay caregiver support. Pts wife had planned to take him to and from surgery and pt is concerned what would happen if she would not pick him up. We discussed the non emergency as a last option, but pt would be billed for same and he does not want to receive a bill. Pts last concern is the weather. He does not want to be using a walker in ice or snow. Pt has decided he would like to cancel surgery for 02/22/2024. Pt will call surgery scheduler and advise her of same. [facility] inquired if pt is going to reschedule and pt said at this time he is relieved that he is postponing and he will call when ready. Pt was encouraged to contact [facility] with any questions or concerns. Pt expressed understanding.

## 2024-02-15 NOTE — TELEPHONE ENCOUNTER
Pt spoke to Lavinia Michelle today, our . She reports patient is requesting to cancel surgery, as he does not have support at home and has concerns with cost of transportation to appts and home from the hospital.

## 2024-02-21 RX ORDER — UMECLIDINIUM BROMIDE AND VILANTEROL TRIFENATATE 62.5; 25 UG/1; UG/1
1 POWDER RESPIRATORY (INHALATION) DAILY
Qty: 60 BLISTER | Refills: 3 | Status: SHIPPED | OUTPATIENT
Start: 2024-02-21

## 2024-02-22 ENCOUNTER — PATIENT OUTREACH (OUTPATIENT)
Dept: OBGYN CLINIC | Facility: HOSPITAL | Age: 69
End: 2024-02-22

## 2024-02-22 NOTE — PROGRESS NOTES
[facility] reviewed pt and pt cancelled his procedure for 02/22/2024. MARTIN contacted pt today and he expressed he feels like so happy since he cancelled surgery. He feels like a million pounds has been lifted off his shoulders. He feels like he is not in the right place to have procedure done right now. He has not rescheduled and is unsure when he will move forward. [facility] advised pt to please contact [facility] if he reschedules and has any additional questions or concerns. [facility] will remain available.

## 2024-05-10 ENCOUNTER — OFFICE VISIT (OUTPATIENT)
Dept: PULMONOLOGY | Facility: CLINIC | Age: 69
End: 2024-05-10
Payer: COMMERCIAL

## 2024-05-10 VITALS
OXYGEN SATURATION: 93 % | WEIGHT: 216 LBS | HEART RATE: 103 BPM | HEIGHT: 66 IN | TEMPERATURE: 96.7 F | SYSTOLIC BLOOD PRESSURE: 112 MMHG | BODY MASS INDEX: 34.72 KG/M2 | DIASTOLIC BLOOD PRESSURE: 86 MMHG

## 2024-05-10 DIAGNOSIS — J44.9 CHRONIC OBSTRUCTIVE PULMONARY DISEASE, UNSPECIFIED COPD TYPE (HCC): ICD-10-CM

## 2024-05-10 DIAGNOSIS — F17.210 SMOKING GREATER THAN 20 PACK YEARS: Primary | ICD-10-CM

## 2024-05-10 PROCEDURE — G2211 COMPLEX E/M VISIT ADD ON: HCPCS | Performed by: INTERNAL MEDICINE

## 2024-05-10 PROCEDURE — 99214 OFFICE O/P EST MOD 30 MIN: CPT | Performed by: INTERNAL MEDICINE

## 2024-05-10 RX ORDER — IPRATROPIUM BROMIDE AND ALBUTEROL SULFATE 2.5; .5 MG/3ML; MG/3ML
3 SOLUTION RESPIRATORY (INHALATION) EVERY 6 HOURS PRN
Qty: 180 ML | Refills: 5 | Status: SHIPPED | OUTPATIENT
Start: 2024-05-10 | End: 2024-08-08
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
## 2024-07-25 DIAGNOSIS — N40.1 BPH ASSOCIATED WITH NOCTURIA: ICD-10-CM

## 2024-07-25 DIAGNOSIS — R35.1 BPH ASSOCIATED WITH NOCTURIA: ICD-10-CM

## 2024-07-25 RX ORDER — TAMSULOSIN HYDROCHLORIDE 0.4 MG/1
0.4 CAPSULE ORAL
Qty: 100 CAPSULE | Refills: 1 | Status: SHIPPED | OUTPATIENT
Start: 2024-07-25

## 2024-10-04 DIAGNOSIS — J44.9 CHRONIC OBSTRUCTIVE PULMONARY DISEASE, UNSPECIFIED COPD TYPE (HCC): ICD-10-CM

## 2024-10-07 RX ORDER — UMECLIDINIUM BROMIDE AND VILANTEROL TRIFENATATE 62.5; 25 UG/1; UG/1
1 POWDER RESPIRATORY (INHALATION) DAILY
Qty: 60 EACH | Refills: 3 | Status: SHIPPED | OUTPATIENT
Start: 2024-10-07

## 2024-10-22 ENCOUNTER — APPOINTMENT (OUTPATIENT)
Dept: LAB | Facility: CLINIC | Age: 69
End: 2024-10-22
Payer: COMMERCIAL

## 2024-10-22 ENCOUNTER — OFFICE VISIT (OUTPATIENT)
Dept: FAMILY MEDICINE CLINIC | Facility: CLINIC | Age: 69
End: 2024-10-22
Payer: COMMERCIAL

## 2024-10-22 VITALS
OXYGEN SATURATION: 93 % | HEIGHT: 66 IN | SYSTOLIC BLOOD PRESSURE: 112 MMHG | WEIGHT: 222 LBS | HEART RATE: 84 BPM | BODY MASS INDEX: 35.68 KG/M2 | TEMPERATURE: 98 F | DIASTOLIC BLOOD PRESSURE: 72 MMHG

## 2024-10-22 DIAGNOSIS — J44.9 CHRONIC OBSTRUCTIVE PULMONARY DISEASE, UNSPECIFIED COPD TYPE (HCC): ICD-10-CM

## 2024-10-22 DIAGNOSIS — Z12.5 SCREENING PSA (PROSTATE SPECIFIC ANTIGEN): ICD-10-CM

## 2024-10-22 DIAGNOSIS — Z00.00 MEDICARE ANNUAL WELLNESS VISIT, SUBSEQUENT: Primary | ICD-10-CM

## 2024-10-22 DIAGNOSIS — F17.210 CIGARETTE NICOTINE DEPENDENCE WITHOUT COMPLICATION: ICD-10-CM

## 2024-10-22 DIAGNOSIS — Z13.220 LIPID SCREENING: ICD-10-CM

## 2024-10-22 DIAGNOSIS — Z23 ENCOUNTER FOR IMMUNIZATION: ICD-10-CM

## 2024-10-22 DIAGNOSIS — Z23 NEED FOR COVID-19 VACCINE: ICD-10-CM

## 2024-10-22 LAB
ALBUMIN SERPL BCG-MCNC: 4.3 G/DL (ref 3.5–5)
ALP SERPL-CCNC: 81 U/L (ref 34–104)
ALT SERPL W P-5'-P-CCNC: 17 U/L (ref 7–52)
ANION GAP SERPL CALCULATED.3IONS-SCNC: 7 MMOL/L (ref 4–13)
AST SERPL W P-5'-P-CCNC: 14 U/L (ref 13–39)
BASOPHILS # BLD AUTO: 0.04 THOUSANDS/ΜL (ref 0–0.1)
BASOPHILS NFR BLD AUTO: 1 % (ref 0–1)
BILIRUB SERPL-MCNC: 0.75 MG/DL (ref 0.2–1)
BUN SERPL-MCNC: 13 MG/DL (ref 5–25)
CALCIUM SERPL-MCNC: 9.4 MG/DL (ref 8.4–10.2)
CHLORIDE SERPL-SCNC: 103 MMOL/L (ref 96–108)
CHOLEST SERPL-MCNC: 157 MG/DL
CO2 SERPL-SCNC: 30 MMOL/L (ref 21–32)
CREAT SERPL-MCNC: 0.74 MG/DL (ref 0.6–1.3)
EOSINOPHIL # BLD AUTO: 0.19 THOUSAND/ΜL (ref 0–0.61)
EOSINOPHIL NFR BLD AUTO: 3 % (ref 0–6)
ERYTHROCYTE [DISTWIDTH] IN BLOOD BY AUTOMATED COUNT: 12.9 % (ref 11.6–15.1)
GFR SERPL CREATININE-BSD FRML MDRD: 95 ML/MIN/1.73SQ M
GLUCOSE P FAST SERPL-MCNC: 102 MG/DL (ref 65–99)
HCT VFR BLD AUTO: 48.2 % (ref 36.5–49.3)
HDLC SERPL-MCNC: 37 MG/DL
HGB BLD-MCNC: 15.6 G/DL (ref 12–17)
IMM GRANULOCYTES # BLD AUTO: 0.02 THOUSAND/UL (ref 0–0.2)
IMM GRANULOCYTES NFR BLD AUTO: 0 % (ref 0–2)
LDLC SERPL CALC-MCNC: 79 MG/DL (ref 0–100)
LYMPHOCYTES # BLD AUTO: 2.16 THOUSANDS/ΜL (ref 0.6–4.47)
LYMPHOCYTES NFR BLD AUTO: 29 % (ref 14–44)
MCH RBC QN AUTO: 31.3 PG (ref 26.8–34.3)
MCHC RBC AUTO-ENTMCNC: 32.4 G/DL (ref 31.4–37.4)
MCV RBC AUTO: 97 FL (ref 82–98)
MONOCYTES # BLD AUTO: 0.68 THOUSAND/ΜL (ref 0.17–1.22)
MONOCYTES NFR BLD AUTO: 9 % (ref 4–12)
NEUTROPHILS # BLD AUTO: 4.28 THOUSANDS/ΜL (ref 1.85–7.62)
NEUTS SEG NFR BLD AUTO: 58 % (ref 43–75)
NRBC BLD AUTO-RTO: 0 /100 WBCS
PLATELET # BLD AUTO: 247 THOUSANDS/UL (ref 149–390)
PMV BLD AUTO: 9.5 FL (ref 8.9–12.7)
POTASSIUM SERPL-SCNC: 4.2 MMOL/L (ref 3.5–5.3)
PROT SERPL-MCNC: 7.4 G/DL (ref 6.4–8.4)
PSA SERPL-MCNC: 0.22 NG/ML (ref 0–4)
RBC # BLD AUTO: 4.98 MILLION/UL (ref 3.88–5.62)
SODIUM SERPL-SCNC: 140 MMOL/L (ref 135–147)
TRIGL SERPL-MCNC: 206 MG/DL
WBC # BLD AUTO: 7.37 THOUSAND/UL (ref 4.31–10.16)

## 2024-10-22 PROCEDURE — 90480 ADMN SARSCOV2 VAC 1/ONLY CMP: CPT

## 2024-10-22 PROCEDURE — G0439 PPPS, SUBSEQ VISIT: HCPCS | Performed by: PHYSICIAN ASSISTANT

## 2024-10-22 PROCEDURE — 85025 COMPLETE CBC W/AUTO DIFF WBC: CPT

## 2024-10-22 PROCEDURE — 36415 COLL VENOUS BLD VENIPUNCTURE: CPT

## 2024-10-22 PROCEDURE — 80053 COMPREHEN METABOLIC PANEL: CPT

## 2024-10-22 PROCEDURE — 91320 SARSCV2 VAC 30MCG TRS-SUC IM: CPT

## 2024-10-22 PROCEDURE — 80061 LIPID PANEL: CPT

## 2024-10-22 PROCEDURE — G0008 ADMIN INFLUENZA VIRUS VAC: HCPCS

## 2024-10-22 PROCEDURE — 90662 IIV NO PRSV INCREASED AG IM: CPT

## 2024-10-22 PROCEDURE — G0103 PSA SCREENING: HCPCS

## 2024-10-22 NOTE — PATIENT INSTRUCTIONS
Medicare Preventive Visit Patient Instructions  Thank you for completing your Welcome to Medicare Visit or Medicare Annual Wellness Visit today. Your next wellness visit will be due in one year (10/23/2025).  The screening/preventive services that you may require over the next 5-10 years are detailed below. Some tests may not apply to you based off risk factors and/or age. Screening tests ordered at today's visit but not completed yet may show as past due. Also, please note that scanned in results may not display below.  Preventive Screenings:  Service Recommendations Previous Testing/Comments   Colorectal Cancer Screening  Colonoscopy    Fecal Occult Blood Test (FOBT)/Fecal Immunochemical Test (FIT)  Fecal DNA/Cologuard Test  Flexible Sigmoidoscopy Age: 45-75 years old   Colonoscopy: every 10 years (May be performed more frequently if at higher risk)  OR  FOBT/FIT: every 1 year  OR  Cologuard: every 3 years  OR  Sigmoidoscopy: every 5 years  Screening may be recommended earlier than age 45 if at higher risk for colorectal cancer. Also, an individualized decision between you and your healthcare provider will decide whether screening between the ages of 76-85 would be appropriate. Colonoscopy: Not on file  FOBT/FIT: Not on file  Cologuard: 11/01/2022  Sigmoidoscopy: Not on file    Screening Current     Prostate Cancer Screening Individualized decision between patient and health care provider in men between ages of 55-69   Medicare will cover every 12 months beginning on the day after your 50th birthday PSA: 0.09 ng/mL           Hepatitis C Screening Once for adults born between 1945 and 1965  More frequently in patients at high risk for Hepatitis C Hep C Antibody: 10/06/2022    Screening Current   Diabetes Screening 1-2 times per year if you're at risk for diabetes or have pre-diabetes Fasting glucose: 97 mg/dL (10/20/2023)  A1C: 5.5 % (1/24/2024)  Screening Current   Cholesterol Screening Once every 5 years if you  don't have a lipid disorder. May order more often based on risk factors. Lipid panel: 10/20/2023  Screening Current      Other Preventive Screenings Covered by Medicare:  Abdominal Aortic Aneurysm (AAA) Screening: covered once if your at risk. You're considered to be at risk if you have a family history of AAA or a male between the age of 65-75 who smoking at least 100 cigarettes in your lifetime.  Lung Cancer Screening: covers low dose CT scan once per year if you meet all of the following conditions: (1) Age 55-77; (2) No signs or symptoms of lung cancer; (3) Current smoker or have quit smoking within the last 15 years; (4) You have a tobacco smoking history of at least 20 pack years (packs per day x number of years you smoked); (5) You get a written order from a healthcare provider.  Glaucoma Screening: covered annually if you're considered high risk: (1) You have diabetes OR (2) Family history of glaucoma OR (3)  aged 50 and older OR (4)  American aged 65 and older  Osteoporosis Screening: covered every 2 years if you meet one of the following conditions: (1) Have a vertebral abnormality; (2) On glucocorticoid therapy for more than 3 months; (3) Have primary hyperparathyroidism; (4) On osteoporosis medications and need to assess response to drug therapy.  HIV Screening: covered annually if you're between the age of 15-65. Also covered annually if you are younger than 15 and older than 65 with risk factors for HIV infection. For pregnant patients, it is covered up to 3 times per pregnancy.    Immunizations:  Immunization Recommendations   Influenza Vaccine Annual influenza vaccination during flu season is recommended for all persons aged >= 6 months who do not have contraindications   Pneumococcal Vaccine   * Pneumococcal conjugate vaccine = PCV13 (Prevnar 13), PCV15 (Vaxneuvance), PCV20 (Prevnar 20)  * Pneumococcal polysaccharide vaccine = PPSV23 (Pneumovax) Adults 19-65 yo with certain  risk factors or if 65+ yo  If never received any pneumonia vaccine: recommend Prevnar 20 (PCV20)  Give PCV20 if previously received 1 dose of PCV13 or PPSV23   Hepatitis B Vaccine 3 dose series if at intermediate or high risk (ex: diabetes, end stage renal disease, liver disease)   Respiratory syncytial virus (RSV) Vaccine - COVERED BY MEDICARE PART D  * RSVPreF3 (Arexvy) CDC recommends that adults 60 years of age and older may receive a single dose of RSV vaccine using shared clinical decision-making (SCDM)   Tetanus (Td) Vaccine - COST NOT COVERED BY MEDICARE PART B Following completion of primary series, a booster dose should be given every 10 years to maintain immunity against tetanus. Td may also be given as tetanus wound prophylaxis.   Tdap Vaccine - COST NOT COVERED BY MEDICARE PART B Recommended at least once for all adults. For pregnant patients, recommended with each pregnancy.   Shingles Vaccine (Shingrix) - COST NOT COVERED BY MEDICARE PART B  2 shot series recommended in those 19 years and older who have or will have weakened immune systems or those 50 years and older     Health Maintenance Due:      Topic Date Due   • Lung Cancer Screening  10/28/2024   • Colorectal Cancer Screening  11/01/2025   • Hepatitis C Screening  Completed     Immunizations Due:      Topic Date Due   • Hepatitis A Vaccine (1 of 2 - Risk 2-dose series) Never done   • Influenza Vaccine (1) 09/01/2024   • COVID-19 Vaccine (3 - 2023-24 season) 09/01/2024     Advance Directives   What are advance directives?  Advance directives are legal documents that state your wishes and plans for medical care. These plans are made ahead of time in case you lose your ability to make decisions for yourself. Advance directives can apply to any medical decision, such as the treatments you want, and if you want to donate organs.   What are the types of advance directives?  There are many types of advance directives, and each state has rules about how  to use them. You may choose a combination of any of the following:  Living will:  This is a written record of the treatment you want. You can also choose which treatments you do not want, which to limit, and which to stop at a certain time. This includes surgery, medicine, IV fluid, and tube feedings.   Durable power of  for healthcare (DPAHC):  This is a written record that states who you want to make healthcare choices for you when you are unable to make them for yourself. This person, called a proxy, is usually a family member or a friend. You may choose more than 1 proxy.  Do not resuscitate (DNR) order:  A DNR order is used in case your heart stops beating or you stop breathing. It is a request not to have certain forms of treatment, such as CPR. A DNR order may be included in other types of advance directives.  Medical directive:  This covers the care that you want if you are in a coma, near death, or unable to make decisions for yourself. You can list the treatments you want for each condition. Treatment may include pain medicine, surgery, blood transfusions, dialysis, IV or tube feedings, and a ventilator (breathing machine).  Values history:  This document has questions about your views, beliefs, and how you feel and think about life. This information can help others choose the care that you would choose.  Why are advance directives important?  An advance directive helps you control your care. Although spoken wishes may be used, it is better to have your wishes written down. Spoken wishes can be misunderstood, or not followed. Treatments may be given even if you do not want them. An advance directive may make it easier for your family to make difficult choices about your care.   Cigarette Smoking and Your Health   Risks to your health if you smoke:  Nicotine and other chemicals found in tobacco damage every cell in your body. Even if you are a light smoker, you have an increased risk for cancer,  heart disease, and lung disease. If you are pregnant or have diabetes, smoking increases your risk for complications.   Benefits to your health if you stop smoking:   You decrease respiratory symptoms such as coughing, wheezing, and shortness of breath.   You reduce your risk for cancers of the lung, mouth, throat, kidney, bladder, pancreas, stomach, and cervix. If you already have cancer, you increase the benefits of chemotherapy. You also reduce your risk for cancer returning or a second cancer from developing.   You reduce your risk for heart disease, blood clots, heart attack, and stroke.   You reduce your risk for lung infections, and diseases such as pneumonia, asthma, chronic bronchitis, and emphysema.  Your circulation improves. More oxygen can be delivered to your body. If you have diabetes, you lower your risk for complications, such as kidney, artery, and eye diseases. You also lower your risk for nerve damage. Nerve damage can lead to amputations, poor vision, and blindness.  You improve your body's ability to heal and to fight infections.  For more information and support to stop smoking:   [email or web address]  Phone: 4- 095 - [phone]  Web Address: www.[employer]  Weight Management   Why it is important to manage your weight:  Being overweight increases your risk of health conditions such as heart disease, high blood pressure, type 2 diabetes, and certain types of cancer. It can also increase your risk for osteoarthritis, sleep apnea, and other respiratory problems. Aim for a slow, steady weight loss. Even a small amount of weight loss can lower your risk of health problems.  How to lose weight safely:  A safe and healthy way to lose weight is to eat fewer calories and get regular exercise. You can lose up about 1 pound a week by decreasing the number of calories you eat by 500 calories each day.   Healthy meal plan for weight management:  A healthy meal plan includes a variety of foods, contains fewer  calories, and helps you stay healthy. A healthy meal plan includes the following:  Eat whole-grain foods more often.  A healthy meal plan should contain fiber. Fiber is the part of grains, fruits, and vegetables that is not broken down by your body. Whole-grain foods are healthy and provide extra fiber in your diet. Some examples of whole-grain foods are whole-wheat breads and pastas, oatmeal, brown rice, and bulgur.  Eat a variety of vegetables every day.  Include dark, leafy greens such as spinach, kale, guanaco greens, and mustard greens. Eat yellow and orange vegetables such as carrots, sweet potatoes, and winter squash.   Eat a variety of fruits every day.  Choose fresh or canned fruit (canned in its own juice or light syrup) instead of juice. Fruit juice has very little or no fiber.  Eat low-fat dairy foods.  Drink fat-free (skim) milk or 1% milk. Eat fat-free yogurt and low-fat cottage cheese. Try low-fat cheeses such as mozzarella and other reduced-fat cheeses.  Choose meat and other protein foods that are low in fat.  Choose beans or other legumes such as split peas or lentils. Choose fish, skinless poultry (chicken or turkey), or lean cuts of red meat (beef or pork). Before you cook meat or poultry, cut off any visible fat.   Use less fat and oil.  Try baking foods instead of frying them. Add less fat, such as margarine, sour cream, regular salad dressing and mayonnaise to foods. Eat fewer high-fat foods. Some examples of high-fat foods include french fries, doughnuts, ice cream, and cakes.  Eat fewer sweets.  Limit foods and drinks that are high in sugar. This includes candy, cookies, regular soda, and sweetened drinks.  Exercise:  Exercise at least 30 minutes per day on most days of the week. Some examples of exercise include walking, biking, dancing, and swimming. You can also fit in more physical activity by taking the stairs instead of the elevator or parking farther away from stores. Ask your  healthcare provider about the best exercise plan for you.      © Copyright [employer] 2018 Information is for End User's use only and may not be sold, redistributed or otherwise used for commercial purposes. All illustrations and images included in CareNotes® are the copyrighted property of [employer] or [employer]

## 2024-10-22 NOTE — PROGRESS NOTES
Ambulatory Visit  Name: Peter Davis      : 1955      MRN: [id number]  Encounter Provider: Rigoberto Casas PA-C  Encounter Date: 10/22/2024   Encounter department: [facility]    Assessment & Plan  Encounter for immunization    Orders:    influenza vaccine, high-dose, PF 0.5 mL (Fluzone High Dose)    Need for COVID-19 vaccine    Orders:    COVID-19 Pfizer mRNA vaccine 12 yr and older (Comirnaty pre-filled syringe)    Chronic obstructive pulmonary disease, unspecified COPD type (HCC)  Continue with pulmonary, smoking cessation encouraged  Orders:    Comprehensive metabolic panel; Future    CBC and differential; Future    Cigarette nicotine dependence without complication  Not ready to quit at this time        Screening PSA (prostate specific antigen)    Orders:    PSA, Total Screen; Future    Medicare annual wellness visit, subsequent  Update screenings       Lipid screening    Orders:    Lipid Panel with Direct LDL reflex; Future       Preventive health issues were discussed with patient, and age appropriate screening tests were ordered as noted in patient's After Visit Summary. Personalized health advice and appropriate referrals for health education or preventive services given if needed, as noted in patient's After Visit Summary.    History of Present Illness     Pt presents for AWV       Patient Care Team:  Cherry Hobbs MD as PCP - General (Family Medicine)  AMADOU Escobedo as  Care Manager (Care Coordination)    Review of Systems   Constitutional:  Negative for chills, fatigue and fever.   HENT:  Negative for congestion, ear pain, hearing loss, nosebleeds, postnasal drip, rhinorrhea, sinus pressure, sinus pain, sneezing and sore throat.    Eyes:  Negative for pain, discharge, itching and visual disturbance.   Respiratory:  Negative for cough, chest tightness, shortness of breath and wheezing.    Cardiovascular:  Negative for chest pain, palpitations and  leg swelling.   Gastrointestinal:  Negative for abdominal pain, blood in stool, constipation, diarrhea, nausea and vomiting.   Genitourinary:  Negative for frequency and urgency.   Neurological:  Negative for dizziness, light-headedness and numbness.     Medical History Reviewed by provider this encounter:       Annual Wellness Visit Questionnaire   Peter Galdamez is here for his Subsequent Wellness visit.     Health Risk Assessment:   Patient rates overall health as good. Patient feels that their physical health rating is same. Patient is satisfied with their life. Eyesight was rated as same. Hearing was rated as same. Patient feels that their emotional and mental health rating is same. Patients states they are never, rarely angry. Patient states they are never, rarely unusually tired/fatigued. Pain experienced in the last 7 days has been some. Patient's pain rating has been 4/10. Patient states that he has experienced no weight loss or gain in last 6 months. Aches and pains    Depression Screening:   PHQ-2 Score: 0      Fall Risk Screening:   In the past year, patient has experienced: no history of falling in past year      Home Safety:  Patient has trouble with stairs inside or outside of their home. Patient has working smoke alarms and has working carbon monoxide detector. Home safety hazards include: none.     Nutrition:   Current diet is Regular.     Medications:   Patient is currently taking over-the-counter supplements. OTC medications include: see medication list. Patient is able to manage medications.     Activities of Daily Living (ADLs)/Instrumental Activities of Daily Living (IADLs):   Walk and transfer into and out of bed and chair?: Yes  Dress and groom yourself?: Yes    Bathe or shower yourself?: Yes    Feed yourself? Yes  Do your laundry/housekeeping?: Yes  Manage your money, pay your bills and track your expenses?: Yes  Make your own meals?: Yes    Do your own shopping?: Yes    Previous  Hospitalizations:   Any hospitalizations or ED visits within the last 12 months?: No      Advance Care Planning:   Living will: No    Durable POA for healthcare: No    Advanced directive: No    Advanced directive counseling given: Yes    ACP document given: Yes      PREVENTIVE SCREENINGS      Cardiovascular Screening:    General: Risks and Benefits Discussed    Due for: Lipid Panel      Diabetes Screening:     General: Screening Current      Colorectal Cancer Screening:     General: Screening Current      Prostate Cancer Screening:    General: Risks and Benefits Discussed    Due for: PSA      Osteoporosis Screening:    General: Screening Not Indicated      Abdominal Aortic Aneurysm (AAA) Screening:    Risk factors include: age between 65-74 yo and tobacco use        General: Screening Not Indicated      Lung Cancer Screening:     General: Risks and Benefits Discussed    Due for: Low Dose CT (LDCT)      Hepatitis C Screening:    General: Screening Current    Screening, Brief Intervention, and Referral to Treatment (SBIRT)    Screening  Typical number of drinks in a day: 0  Typical number of drinks in a week: 8  Interpretation: Low risk drinking behavior.    Single Item Drug Screening:  How often have you used an illegal drug (including marijuana) or a prescription medication for non-medical reasons in the past year? never    Single Item Drug Screen Score: 0  Interpretation: Negative screen for possible drug use disorder    Social Determinants of Health     Financial Resource Strain: Low Risk  (10/19/2023)    Overall Financial Resource Strain (CARDIA)     Difficulty of Paying Living Expenses: Not very hard   Food Insecurity: No Food Insecurity (10/22/2024)    Hunger Vital Sign     Worried About Running Out of Food in the Last Year: Never true     Ran Out of Food in the Last Year: Never true   Transportation Needs: No Transportation Needs (10/22/2024)    PRAPARE - Transportation     Lack of Transportation (Medical): No  "    Lack of Transportation (Non-Medical): No   Housing Stability: Low Risk  (10/22/2024)    Housing Stability Vital Sign     Unable to Pay for Housing in the Last Year: No     Number of Times Moved in the Last Year: 1     Homeless in the Last Year: No   Utilities: Not At Risk (10/22/2024)    [facility] Utilities     Threatened with loss of utilities: No     No results found.    Objective     /72   Pulse 84   Temp 98 °F (36.7 °C)   Ht 5' 6\" (1.676 m)   Wt 101 kg (222 lb)   SpO2 93%   BMI 35.83 kg/m²     Physical Exam  Vitals and nursing note reviewed.   Constitutional:       General: He is not in acute distress.     Appearance: He is well-developed.   HENT:      Head: Normocephalic and atraumatic.   Eyes:      Conjunctiva/sclera: Conjunctivae normal.   Cardiovascular:      Rate and Rhythm: Normal rate and regular rhythm.      Heart sounds: No murmur heard.  Pulmonary:      Effort: Pulmonary effort is normal. No respiratory distress.      Breath sounds: Decreased breath sounds present. No wheezing, rhonchi or rales.   Musculoskeletal:         General: No swelling.      Cervical back: Neck supple.   Skin:     General: Skin is warm and dry.      Capillary Refill: Capillary refill takes less than 2 seconds.   Neurological:      Mental Status: He is alert.   Psychiatric:         Mood and Affect: Mood normal.         "

## 2024-10-22 NOTE — PROGRESS NOTES
"Ambulatory Visit  Name: Peter Davis      : 1955      MRN: [id number]  Encounter Provider: Rigoberto Casas PA-C  Encounter Date: 10/22/2024   Encounter department: [facility]    Assessment & Plan       Preventive health issues were discussed with patient, and age appropriate screening tests were ordered as noted in patient's After Visit Summary. Personalized health advice and appropriate referrals for health education or preventive services given if needed, as noted in patient's After Visit Summary.    History of Present Illness     HPI   Patient Care Team:  Cherry Hobbs MD as PCP - General (Family Medicine)  AMADOU Escobedo as  Care Manager (Care Coordination)    Review of Systems  Medical History Reviewed by provider this encounter:       Annual Wellness Visit Questionnaire   Annual Wellness Visit  Social Determinants of Health     Financial Resource Strain: Low Risk  (10/19/2023)    Overall Financial Resource Strain (CARDIA)    • Difficulty of Paying Living Expenses: Not very hard   Food Insecurity: No Food Insecurity (10/22/2024)    Hunger Vital Sign    • Worried About Running Out of Food in the Last Year: Never true    • Ran Out of Food in the Last Year: Never true   Transportation Needs: No Transportation Needs (10/22/2024)    PRAPARE - Transportation    • Lack of Transportation (Medical): No    • Lack of Transportation (Non-Medical): No   Housing Stability: Low Risk  (10/22/2024)    Housing Stability Vital Sign    • Unable to Pay for Housing in the Last Year: No    • Number of Times Moved in the Last Year: 1    • Homeless in the Last Year: No   Utilities: Not At Risk (10/22/2024)    [facility] Utilities    • Threatened with loss of utilities: No     No results found.    Objective     /72   Pulse 84   Temp 98 °F (36.7 °C)   Ht 5' 6\" (1.676 m)   Wt 101 kg (222 lb)   SpO2 93%   BMI 35.83 kg/m²     Physical Exam    "

## 2024-10-22 NOTE — ASSESSMENT & PLAN NOTE
Continue with pulmonary, smoking cessation encouraged  Orders:    Comprehensive metabolic panel; Future    CBC and differential; Future

## 2024-10-23 ENCOUNTER — TELEPHONE (OUTPATIENT)
Dept: FAMILY MEDICINE CLINIC | Facility: CLINIC | Age: 69
End: 2024-10-23

## 2024-10-23 NOTE — TELEPHONE ENCOUNTER
----- Message from Rigoberto Casas PA-C sent at 10/23/2024  6:45 AM EDT -----  Triglycerides a bit high, recommend low carb/fat diet, routine CV exercise   Sugar slightly elevated, recommendations as above  Normal PSA and blood counts

## 2024-11-20 ENCOUNTER — HOSPITAL ENCOUNTER (OUTPATIENT)
Dept: CT IMAGING | Facility: HOSPITAL | Age: 69
Discharge: HOME | End: 2024-11-20
Attending: INTERNAL MEDICINE

## 2024-11-20 DIAGNOSIS — F17.210 SMOKING GREATER THAN 20 PACK YEARS: ICD-10-CM

## 2024-11-27 ENCOUNTER — TELEPHONE (OUTPATIENT)
Age: 69
End: 2024-11-27

## 2024-11-27 NOTE — TELEPHONE ENCOUNTER
Please call patient and let him know his LDCT looks good. Several small stable nodules appear nonconcerning. He can follow up with annual lung screening CT.

## 2025-02-16 DIAGNOSIS — J44.9 CHRONIC OBSTRUCTIVE PULMONARY DISEASE, UNSPECIFIED COPD TYPE (HCC): ICD-10-CM

## 2025-02-17 RX ORDER — UMECLIDINIUM BROMIDE AND VILANTEROL TRIFENATATE 62.5; 25 UG/1; UG/1
1 POWDER RESPIRATORY (INHALATION) DAILY
Qty: 60 EACH | Refills: 5 | Status: SHIPPED | OUTPATIENT
Start: 2025-02-17

## 2025-02-25 ENCOUNTER — TELEPHONE (OUTPATIENT)
Age: 70
End: 2025-02-25

## 2025-02-25 NOTE — TELEPHONE ENCOUNTER
WHO - patient     WHAT - chronic body pain and fatigue    WHEN - 6 months     How Often/Duration -    Pain -    Alleviating Factors (anything they tried to use to help so far) -    Next Steps -schedule apt     Callback- patient

## 2025-02-26 ENCOUNTER — OFFICE VISIT (OUTPATIENT)
Dept: FAMILY MEDICINE CLINIC | Facility: CLINIC | Age: 70
End: 2025-02-26
Payer: COMMERCIAL

## 2025-02-26 ENCOUNTER — APPOINTMENT (OUTPATIENT)
Dept: RADIOLOGY | Facility: CLINIC | Age: 70
End: 2025-02-26
Payer: COMMERCIAL

## 2025-02-26 VITALS
WEIGHT: 220 LBS | BODY MASS INDEX: 35.36 KG/M2 | OXYGEN SATURATION: 92 % | HEART RATE: 101 BPM | SYSTOLIC BLOOD PRESSURE: 128 MMHG | HEIGHT: 66 IN | TEMPERATURE: 98.9 F | DIASTOLIC BLOOD PRESSURE: 76 MMHG

## 2025-02-26 DIAGNOSIS — M25.50 POLYARTHRALGIA: Primary | ICD-10-CM

## 2025-02-26 DIAGNOSIS — G89.29 CHRONIC PAIN OF BOTH SHOULDERS: ICD-10-CM

## 2025-02-26 DIAGNOSIS — M79.641 CHRONIC PAIN OF RIGHT HAND: ICD-10-CM

## 2025-02-26 DIAGNOSIS — G89.29 CHRONIC PAIN OF RIGHT HAND: ICD-10-CM

## 2025-02-26 DIAGNOSIS — M25.50 POLYARTHRALGIA: ICD-10-CM

## 2025-02-26 DIAGNOSIS — M79.642 CHRONIC HAND PAIN, LEFT: ICD-10-CM

## 2025-02-26 DIAGNOSIS — M25.511 CHRONIC PAIN OF BOTH SHOULDERS: ICD-10-CM

## 2025-02-26 DIAGNOSIS — M25.512 CHRONIC PAIN OF BOTH SHOULDERS: ICD-10-CM

## 2025-02-26 DIAGNOSIS — G89.29 CHRONIC HAND PAIN, LEFT: ICD-10-CM

## 2025-02-26 DIAGNOSIS — Z79.899 OTHER LONG TERM (CURRENT) DRUG THERAPY: ICD-10-CM

## 2025-02-26 DIAGNOSIS — R53.82 CHRONIC FATIGUE: ICD-10-CM

## 2025-02-26 DIAGNOSIS — J44.9 CHRONIC OBSTRUCTIVE PULMONARY DISEASE, UNSPECIFIED COPD TYPE (HCC): ICD-10-CM

## 2025-02-26 PROCEDURE — G2211 COMPLEX E/M VISIT ADD ON: HCPCS | Performed by: PHYSICIAN ASSISTANT

## 2025-02-26 PROCEDURE — 99214 OFFICE O/P EST MOD 30 MIN: CPT | Performed by: PHYSICIAN ASSISTANT

## 2025-02-26 PROCEDURE — 73030 X-RAY EXAM OF SHOULDER: CPT

## 2025-02-26 PROCEDURE — 73130 X-RAY EXAM OF HAND: CPT

## 2025-02-26 RX ORDER — DICLOFENAC SODIUM 75 MG/1
75 TABLET, DELAYED RELEASE ORAL 2 TIMES DAILY
Qty: 60 TABLET | Refills: 0 | Status: ON HOLD | OUTPATIENT
Start: 2025-02-26 | End: 2025-03-28
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
## 2025-02-27 ENCOUNTER — RESULTS FOLLOW-UP (OUTPATIENT)
Dept: FAMILY MEDICINE CLINIC | Facility: CLINIC | Age: 70
End: 2025-02-27

## 2025-02-27 DIAGNOSIS — E29.1 HYPOGONADISM MALE: ICD-10-CM

## 2025-02-27 DIAGNOSIS — E53.8 B12 DEFICIENCY: Primary | ICD-10-CM

## 2025-02-27 DIAGNOSIS — E55.9 VITAMIN D DEFICIENCY: ICD-10-CM

## 2025-03-01 ENCOUNTER — APPOINTMENT (OUTPATIENT)
Dept: LAB | Facility: CLINIC | Age: 70
End: 2025-03-01
Payer: COMMERCIAL

## 2025-03-01 DIAGNOSIS — Z79.899 OTHER LONG TERM (CURRENT) DRUG THERAPY: ICD-10-CM

## 2025-03-01 DIAGNOSIS — R53.82 CHRONIC FATIGUE: ICD-10-CM

## 2025-03-01 DIAGNOSIS — M25.50 POLYARTHRALGIA: ICD-10-CM

## 2025-03-01 LAB
25(OH)D3 SERPL-MCNC: 17.2 NG/ML (ref 30–100)
ALBUMIN SERPL BCG-MCNC: 4.3 G/DL (ref 3.5–5)
ALP SERPL-CCNC: 88 U/L (ref 34–104)
ALT SERPL W P-5'-P-CCNC: 18 U/L (ref 7–52)
ANION GAP SERPL CALCULATED.3IONS-SCNC: 9 MMOL/L (ref 4–13)
AST SERPL W P-5'-P-CCNC: 18 U/L (ref 13–39)
BILIRUB SERPL-MCNC: 0.73 MG/DL (ref 0.2–1)
BUN SERPL-MCNC: 13 MG/DL (ref 5–25)
CALCIUM SERPL-MCNC: 9.4 MG/DL (ref 8.4–10.2)
CHLORIDE SERPL-SCNC: 104 MMOL/L (ref 96–108)
CO2 SERPL-SCNC: 27 MMOL/L (ref 21–32)
CREAT SERPL-MCNC: 0.95 MG/DL (ref 0.6–1.3)
CRP SERPL QL: 11.4 MG/L
GFR SERPL CREATININE-BSD FRML MDRD: 82 ML/MIN/1.73SQ M
GLUCOSE P FAST SERPL-MCNC: 104 MG/DL (ref 65–99)
POTASSIUM SERPL-SCNC: 4.1 MMOL/L (ref 3.5–5.3)
PROT SERPL-MCNC: 7.4 G/DL (ref 6.4–8.4)
SODIUM SERPL-SCNC: 140 MMOL/L (ref 135–147)
TESTOST SERPL-MSCNC: 153 NG/DL (ref 175–781)
TSH SERPL DL<=0.05 MIU/L-ACNC: 1.41 UIU/ML (ref 0.45–4.5)
VIT B12 SERPL-MCNC: 226 PG/ML (ref 180–914)

## 2025-03-01 PROCEDURE — 36415 COLL VENOUS BLD VENIPUNCTURE: CPT

## 2025-03-01 PROCEDURE — 82607 VITAMIN B-12: CPT

## 2025-03-01 PROCEDURE — 86140 C-REACTIVE PROTEIN: CPT

## 2025-03-01 PROCEDURE — 86038 ANTINUCLEAR ANTIBODIES: CPT

## 2025-03-01 PROCEDURE — 86225 DNA ANTIBODY NATIVE: CPT

## 2025-03-01 PROCEDURE — 82306 VITAMIN D 25 HYDROXY: CPT

## 2025-03-01 PROCEDURE — 80053 COMPREHEN METABOLIC PANEL: CPT

## 2025-03-01 PROCEDURE — 86430 RHEUMATOID FACTOR TEST QUAL: CPT

## 2025-03-01 PROCEDURE — 84403 ASSAY OF TOTAL TESTOSTERONE: CPT

## 2025-03-01 PROCEDURE — 84443 ASSAY THYROID STIM HORMONE: CPT

## 2025-03-01 PROCEDURE — 86618 LYME DISEASE ANTIBODY: CPT

## 2025-03-02 LAB — B BURGDOR IGG+IGM SER QL IA: NEGATIVE

## 2025-03-03 LAB — RHEUMATOID FACT SER QL LA: NEGATIVE

## 2025-03-05 ENCOUNTER — APPOINTMENT (OUTPATIENT)
Dept: LAB | Facility: CLINIC | Age: 70
DRG: 309 | End: 2025-03-05
Payer: COMMERCIAL

## 2025-03-05 DIAGNOSIS — M25.50 POLYARTHRALGIA: ICD-10-CM

## 2025-03-05 DIAGNOSIS — R53.82 CHRONIC FATIGUE: ICD-10-CM

## 2025-03-05 DIAGNOSIS — Z79.899 OTHER LONG TERM (CURRENT) DRUG THERAPY: ICD-10-CM

## 2025-03-05 LAB
BASOPHILS # BLD AUTO: 0.03 THOUSANDS/ÂΜL (ref 0–0.1)
BASOPHILS NFR BLD AUTO: 0 % (ref 0–1)
EOSINOPHIL # BLD AUTO: 0.3 THOUSAND/ÂΜL (ref 0–0.61)
EOSINOPHIL NFR BLD AUTO: 4 % (ref 0–6)
ERYTHROCYTE [DISTWIDTH] IN BLOOD BY AUTOMATED COUNT: 12.7 % (ref 11.6–15.1)
ERYTHROCYTE [SEDIMENTATION RATE] IN BLOOD: 23 MM/HOUR (ref 0–19)
HCT VFR BLD AUTO: 45.9 % (ref 36.5–49.3)
HGB BLD-MCNC: 15 G/DL (ref 12–17)
IMM GRANULOCYTES # BLD AUTO: 0.02 THOUSAND/UL (ref 0–0.2)
IMM GRANULOCYTES NFR BLD AUTO: 0 % (ref 0–2)
LYMPHOCYTES # BLD AUTO: 1.58 THOUSANDS/ÂΜL (ref 0.6–4.47)
LYMPHOCYTES NFR BLD AUTO: 23 % (ref 14–44)
MCH RBC QN AUTO: 30.4 PG (ref 26.8–34.3)
MCHC RBC AUTO-ENTMCNC: 32.7 G/DL (ref 31.4–37.4)
MCV RBC AUTO: 93 FL (ref 82–98)
MONOCYTES # BLD AUTO: 0.69 THOUSAND/ÂΜL (ref 0.17–1.22)
MONOCYTES NFR BLD AUTO: 10 % (ref 4–12)
NEUTROPHILS # BLD AUTO: 4.39 THOUSANDS/ÂΜL (ref 1.85–7.62)
NEUTS SEG NFR BLD AUTO: 63 % (ref 43–75)
NRBC BLD AUTO-RTO: 0 /100 WBCS
PLATELET # BLD AUTO: 251 THOUSANDS/UL (ref 149–390)
PMV BLD AUTO: 9.1 FL (ref 8.9–12.7)
RBC # BLD AUTO: 4.94 MILLION/UL (ref 3.88–5.62)
WBC # BLD AUTO: 7.01 THOUSAND/UL (ref 4.31–10.16)

## 2025-03-05 PROCEDURE — 85652 RBC SED RATE AUTOMATED: CPT

## 2025-03-05 PROCEDURE — 85025 COMPLETE CBC W/AUTO DIFF WBC: CPT

## 2025-03-05 PROCEDURE — 36415 COLL VENOUS BLD VENIPUNCTURE: CPT

## 2025-03-06 LAB
DSDNA IGG SERPL IA-ACNC: <0.9 IU/ML (ref ?–15)
NUCLEAR IGG SER IA-RTO: 0.2 RATIO (ref ?–1)

## 2025-03-06 RX ORDER — ERGOCALCIFEROL 1.25 MG/1
1 CAPSULE ORAL WEEKLY
Qty: 12 CAPSULE | Refills: 0 | Status: SHIPPED | OUTPATIENT
Start: 2025-03-06 | End: 2025-05-02

## 2025-03-06 RX ORDER — LANOLIN ALCOHOL/MO/W.PET/CERES
1000 CREAM (GRAM) TOPICAL DAILY
Qty: 90 TABLET | Refills: 0 | Status: SHIPPED | OUTPATIENT
Start: 2025-03-06

## 2025-03-07 ENCOUNTER — HOSPITAL ENCOUNTER (INPATIENT)
Facility: HOSPITAL | Age: 70
LOS: 3 days | Discharge: HOME | DRG: 309 | End: 2025-03-10
Attending: EMERGENCY MEDICINE
Payer: COMMERCIAL

## 2025-03-07 ENCOUNTER — OFFICE VISIT (OUTPATIENT)
Dept: FAMILY MEDICINE CLINIC | Facility: CLINIC | Age: 70
End: 2025-03-07
Payer: COMMERCIAL

## 2025-03-07 ENCOUNTER — APPOINTMENT (EMERGENCY)
Dept: RADIOLOGY | Facility: HOSPITAL | Age: 70
DRG: 309 | End: 2025-03-07
Payer: COMMERCIAL

## 2025-03-07 VITALS
OXYGEN SATURATION: 98 % | WEIGHT: 222 LBS | DIASTOLIC BLOOD PRESSURE: 70 MMHG | HEIGHT: 66 IN | HEART RATE: 69 BPM | TEMPERATURE: 100.7 F | BODY MASS INDEX: 35.68 KG/M2 | SYSTOLIC BLOOD PRESSURE: 110 MMHG

## 2025-03-07 DIAGNOSIS — R31.29 OTHER MICROSCOPIC HEMATURIA: ICD-10-CM

## 2025-03-07 DIAGNOSIS — I48.91 ATRIAL FIBRILLATION WITH RAPID VENTRICULAR RESPONSE (HCC): Primary | ICD-10-CM

## 2025-03-07 DIAGNOSIS — R06.83 SNORING: ICD-10-CM

## 2025-03-07 DIAGNOSIS — R68.83 CHILLS: ICD-10-CM

## 2025-03-07 DIAGNOSIS — L50.9 URTICARIA: ICD-10-CM

## 2025-03-07 DIAGNOSIS — Z11.59 SCREENING FOR VIRAL DISEASE: ICD-10-CM

## 2025-03-07 DIAGNOSIS — R53.83 OTHER FATIGUE: ICD-10-CM

## 2025-03-07 DIAGNOSIS — R52 GENERALIZED BODY ACHES: ICD-10-CM

## 2025-03-07 DIAGNOSIS — K65.4 MESENTERIC PANNICULITIS (HCC): ICD-10-CM

## 2025-03-07 DIAGNOSIS — J18.9 PNEUMONIA: ICD-10-CM

## 2025-03-07 DIAGNOSIS — I20.89 ANGINAL EQUIVALENT (HCC): ICD-10-CM

## 2025-03-07 DIAGNOSIS — R50.9 FEVER, UNSPECIFIED FEVER CAUSE: ICD-10-CM

## 2025-03-07 PROBLEM — R30.0 DYSURIA: Status: ACTIVE | Noted: 2025-03-07

## 2025-03-07 LAB
ALBUMIN SERPL BCG-MCNC: 4 G/DL (ref 3.5–5)
ALP SERPL-CCNC: 82 U/L (ref 34–104)
ALT SERPL W P-5'-P-CCNC: 25 U/L (ref 7–52)
ANION GAP SERPL CALCULATED.3IONS-SCNC: 9 MMOL/L (ref 4–13)
APTT PPP: 32 SECONDS (ref 23–34)
AST SERPL W P-5'-P-CCNC: 18 U/L (ref 13–39)
ATRIAL RATE: 131 BPM
ATRIAL RATE: 416 BPM
ATRIAL RATE: 93 BPM
BACTERIA UR QL AUTO: ABNORMAL /HPF
BASOPHILS # BLD AUTO: 0.02 THOUSANDS/ÂΜL (ref 0–0.1)
BASOPHILS NFR BLD AUTO: 0 % (ref 0–1)
BILIRUB SERPL-MCNC: 0.89 MG/DL (ref 0.2–1)
BILIRUB UR QL STRIP: NEGATIVE
BUN SERPL-MCNC: 15 MG/DL (ref 5–25)
CALCIUM SERPL-MCNC: 9 MG/DL (ref 8.4–10.2)
CHLORIDE SERPL-SCNC: 106 MMOL/L (ref 96–108)
CLARITY UR: CLEAR
CO2 SERPL-SCNC: 24 MMOL/L (ref 21–32)
COLOR UR: YELLOW
CREAT SERPL-MCNC: 0.89 MG/DL (ref 0.6–1.3)
EOSINOPHIL # BLD AUTO: 0.15 THOUSAND/ÂΜL (ref 0–0.61)
EOSINOPHIL NFR BLD AUTO: 2 % (ref 0–6)
ERYTHROCYTE [DISTWIDTH] IN BLOOD BY AUTOMATED COUNT: 12.8 % (ref 11.6–15.1)
FLUAV AG UPPER RESP QL IA.RAPID: NEGATIVE
FLUBV AG UPPER RESP QL IA.RAPID: NEGATIVE
GFR SERPL CREATININE-BSD FRML MDRD: 88 ML/MIN/1.73SQ M
GLUCOSE SERPL-MCNC: 112 MG/DL (ref 65–140)
GLUCOSE UR STRIP-MCNC: NEGATIVE MG/DL
HCT VFR BLD AUTO: 48 % (ref 36.5–49.3)
HGB BLD-MCNC: 15.8 G/DL (ref 12–17)
HGB UR QL STRIP.AUTO: ABNORMAL
IMM GRANULOCYTES # BLD AUTO: 0.04 THOUSAND/UL (ref 0–0.2)
IMM GRANULOCYTES NFR BLD AUTO: 0 % (ref 0–2)
INR PPP: 1.03 (ref 0.85–1.19)
KETONES UR STRIP-MCNC: NEGATIVE MG/DL
LACTATE SERPL-SCNC: 1.2 MMOL/L (ref 0.5–2)
LEUKOCYTE ESTERASE UR QL STRIP: ABNORMAL
LYMPHOCYTES # BLD AUTO: 0.83 THOUSANDS/ÂΜL (ref 0.6–4.47)
LYMPHOCYTES NFR BLD AUTO: 9 % (ref 14–44)
MCH RBC QN AUTO: 30.1 PG (ref 26.8–34.3)
MCHC RBC AUTO-ENTMCNC: 32.9 G/DL (ref 31.4–37.4)
MCV RBC AUTO: 91 FL (ref 82–98)
MONOCYTES # BLD AUTO: 1.07 THOUSAND/ÂΜL (ref 0.17–1.22)
MONOCYTES NFR BLD AUTO: 11 % (ref 4–12)
MUCOUS THREADS UR QL AUTO: ABNORMAL
NEUTROPHILS # BLD AUTO: 7.68 THOUSANDS/ÂΜL (ref 1.85–7.62)
NEUTS SEG NFR BLD AUTO: 78 % (ref 43–75)
NITRITE UR QL STRIP: NEGATIVE
NON-SQ EPI CELLS URNS QL MICRO: ABNORMAL /HPF
NRBC BLD AUTO-RTO: 0 /100 WBCS
PH UR STRIP.AUTO: 5.5 [PH]
PLATELET # BLD AUTO: 204 THOUSANDS/UL (ref 149–390)
PMV BLD AUTO: 8.7 FL (ref 8.9–12.7)
POTASSIUM SERPL-SCNC: 4.2 MMOL/L (ref 3.5–5.3)
PROCALCITONIN SERPL-MCNC: 0.41 NG/ML
PROT SERPL-MCNC: 6.8 G/DL (ref 6.4–8.4)
PROT UR STRIP-MCNC: ABNORMAL MG/DL
PROTHROMBIN TIME: 14.2 SECONDS (ref 12.3–15)
QRS AXIS: 102 DEGREES
QRS AXIS: 107 DEGREES
QRS AXIS: 110 DEGREES
QRSD INTERVAL: 104 MS
QRSD INTERVAL: 104 MS
QRSD INTERVAL: 106 MS
QT INTERVAL: 322 MS
QT INTERVAL: 332 MS
QT INTERVAL: 352 MS
QTC INTERVAL: 451 MS
QTC INTERVAL: 454 MS
QTC INTERVAL: 476 MS
RBC # BLD AUTO: 5.25 MILLION/UL (ref 3.88–5.62)
RBC #/AREA URNS AUTO: ABNORMAL /HPF
SARS-COV+SARS-COV-2 AG RESP QL IA.RAPID: NEGATIVE
SARS-COV-2 AG UPPER RESP QL IA: NEGATIVE
SL AMB  POCT GLUCOSE, UA: NEGATIVE
SL AMB LEUKOCYTE ESTERASE,UA: NEGATIVE
SL AMB POCT BILIRUBIN,UA: NEGATIVE
SL AMB POCT BLOOD,UA: ABNORMAL
SL AMB POCT KETONES,UA: NEGATIVE
SL AMB POCT NITRITE,UA: NEGATIVE
SL AMB POCT PH,UA: 6
SL AMB POCT RAPID FLU A: NEGATIVE
SL AMB POCT RAPID FLU B: NEGATIVE
SL AMB POCT SPECIFIC GRAVITY,UA: 1.01
SL AMB POCT URINE PROTEIN: NEGATIVE
SL AMB POCT UROBILINOGEN: ABNORMAL
SODIUM SERPL-SCNC: 139 MMOL/L (ref 135–147)
SP GR UR STRIP.AUTO: 1.03 (ref 1–1.03)
T WAVE AXIS: 55 DEGREES
T WAVE AXIS: 55 DEGREES
T WAVE AXIS: 94 DEGREES
UROBILINOGEN UR STRIP-ACNC: <2 MG/DL
VALID CONTROL: NORMAL
VENTRICULAR RATE: 100 BPM
VENTRICULAR RATE: 118 BPM
VENTRICULAR RATE: 124 BPM
WBC # BLD AUTO: 9.79 THOUSAND/UL (ref 4.31–10.16)
WBC #/AREA URNS AUTO: ABNORMAL /HPF

## 2025-03-07 PROCEDURE — 87804 INFLUENZA ASSAY W/OPTIC: CPT | Performed by: FAMILY MEDICINE

## 2025-03-07 PROCEDURE — 99215 OFFICE O/P EST HI 40 MIN: CPT | Performed by: FAMILY MEDICINE

## 2025-03-07 PROCEDURE — 96361 HYDRATE IV INFUSION ADD-ON: CPT

## 2025-03-07 PROCEDURE — 93000 ELECTROCARDIOGRAM COMPLETE: CPT | Performed by: FAMILY MEDICINE

## 2025-03-07 PROCEDURE — 36415 COLL VENOUS BLD VENIPUNCTURE: CPT | Performed by: EMERGENCY MEDICINE

## 2025-03-07 PROCEDURE — 87804 INFLUENZA ASSAY W/OPTIC: CPT | Performed by: EMERGENCY MEDICINE

## 2025-03-07 PROCEDURE — 93010 ELECTROCARDIOGRAM REPORT: CPT | Performed by: INTERNAL MEDICINE

## 2025-03-07 PROCEDURE — 80053 COMPREHEN METABOLIC PANEL: CPT | Performed by: EMERGENCY MEDICINE

## 2025-03-07 PROCEDURE — 84145 PROCALCITONIN (PCT): CPT | Performed by: EMERGENCY MEDICINE

## 2025-03-07 PROCEDURE — 81003 URINALYSIS AUTO W/O SCOPE: CPT | Performed by: FAMILY MEDICINE

## 2025-03-07 PROCEDURE — 85730 THROMBOPLASTIN TIME PARTIAL: CPT | Performed by: EMERGENCY MEDICINE

## 2025-03-07 PROCEDURE — 85025 COMPLETE CBC W/AUTO DIFF WBC: CPT | Performed by: EMERGENCY MEDICINE

## 2025-03-07 PROCEDURE — 87811 SARS-COV-2 COVID19 W/OPTIC: CPT | Performed by: FAMILY MEDICINE

## 2025-03-07 PROCEDURE — 96375 TX/PRO/DX INJ NEW DRUG ADDON: CPT

## 2025-03-07 PROCEDURE — 93005 ELECTROCARDIOGRAM TRACING: CPT

## 2025-03-07 PROCEDURE — 99285 EMERGENCY DEPT VISIT HI MDM: CPT | Performed by: EMERGENCY MEDICINE

## 2025-03-07 PROCEDURE — 99285 EMERGENCY DEPT VISIT HI MDM: CPT

## 2025-03-07 PROCEDURE — 87811 SARS-COV-2 COVID19 W/OPTIC: CPT | Performed by: EMERGENCY MEDICINE

## 2025-03-07 PROCEDURE — G2211 COMPLEX E/M VISIT ADD ON: HCPCS | Performed by: FAMILY MEDICINE

## 2025-03-07 PROCEDURE — 87086 URINE CULTURE/COLONY COUNT: CPT | Performed by: FAMILY MEDICINE

## 2025-03-07 PROCEDURE — 71045 X-RAY EXAM CHEST 1 VIEW: CPT

## 2025-03-07 PROCEDURE — 81001 URINALYSIS AUTO W/SCOPE: CPT | Performed by: EMERGENCY MEDICINE

## 2025-03-07 PROCEDURE — 83605 ASSAY OF LACTIC ACID: CPT | Performed by: EMERGENCY MEDICINE

## 2025-03-07 PROCEDURE — 99222 1ST HOSP IP/OBS MODERATE 55: CPT

## 2025-03-07 PROCEDURE — 87040 BLOOD CULTURE FOR BACTERIA: CPT | Performed by: EMERGENCY MEDICINE

## 2025-03-07 PROCEDURE — 85610 PROTHROMBIN TIME: CPT | Performed by: EMERGENCY MEDICINE

## 2025-03-07 PROCEDURE — 96374 THER/PROPH/DIAG INJ IV PUSH: CPT

## 2025-03-07 RX ORDER — IPRATROPIUM BROMIDE AND ALBUTEROL SULFATE 2.5; .5 MG/3ML; MG/3ML
3 SOLUTION RESPIRATORY (INHALATION) EVERY 6 HOURS PRN
Status: DISCONTINUED | OUTPATIENT
Start: 2025-03-07 | End: 2025-03-10 | Stop reason: HOSPADM

## 2025-03-07 RX ORDER — DILTIAZEM HYDROCHLORIDE 5 MG/ML
10 INJECTION INTRAVENOUS ONCE
Status: COMPLETED | OUTPATIENT
Start: 2025-03-07 | End: 2025-03-07

## 2025-03-07 RX ORDER — TAMSULOSIN HYDROCHLORIDE 0.4 MG/1
0.4 CAPSULE ORAL
Status: DISCONTINUED | OUTPATIENT
Start: 2025-03-08 | End: 2025-03-10 | Stop reason: HOSPADM

## 2025-03-07 RX ORDER — DIPHENHYDRAMINE HCL 12.5 MG/5ML
25 SOLUTION ORAL EVERY 6 HOURS
Status: DISCONTINUED | OUTPATIENT
Start: 2025-03-07 | End: 2025-03-10 | Stop reason: HOSPADM

## 2025-03-07 RX ORDER — ACETAMINOPHEN 325 MG/1
650 TABLET ORAL EVERY 4 HOURS PRN
Status: DISCONTINUED | OUTPATIENT
Start: 2025-03-07 | End: 2025-03-08

## 2025-03-07 RX ORDER — BENZOCAINE/MENTHOL 6 MG-10 MG
LOZENGE MUCOUS MEMBRANE 4 TIMES DAILY PRN
Status: DISCONTINUED | OUTPATIENT
Start: 2025-03-07 | End: 2025-03-10 | Stop reason: HOSPADM

## 2025-03-07 RX ORDER — ENOXAPARIN SODIUM 100 MG/ML
40 INJECTION SUBCUTANEOUS
Status: DISCONTINUED | OUTPATIENT
Start: 2025-03-07 | End: 2025-03-08

## 2025-03-07 RX ADMIN — DIPHENHYDRAMINE HYDROCHLORIDE 25 MG: 25 SOLUTION ORAL at 15:47

## 2025-03-07 RX ADMIN — ACETAMINOPHEN 650 MG: 325 TABLET, FILM COATED ORAL at 21:45

## 2025-03-07 RX ADMIN — NICOTINE 1 PATCH: 7 PATCH, EXTENDED RELEASE TRANSDERMAL at 14:19

## 2025-03-07 RX ADMIN — CEFTRIAXONE SODIUM 1000 MG: 10 INJECTION, POWDER, FOR SOLUTION INTRAVENOUS at 13:12

## 2025-03-07 RX ADMIN — METOPROLOL TARTRATE 12.5 MG: 25 TABLET, FILM COATED ORAL at 21:37

## 2025-03-07 RX ADMIN — ENOXAPARIN SODIUM 40 MG: 40 INJECTION SUBCUTANEOUS at 15:47

## 2025-03-07 RX ADMIN — DILTIAZEM HYDROCHLORIDE 10 MG: 5 INJECTION INTRAVENOUS at 12:21

## 2025-03-07 RX ADMIN — DILTIAZEM HYDROCHLORIDE 10 MG: 5 INJECTION INTRAVENOUS at 12:49

## 2025-03-07 RX ADMIN — DIPHENHYDRAMINE HYDROCHLORIDE 25 MG: 25 SOLUTION ORAL at 21:36

## 2025-03-07 RX ADMIN — HYDROCORTISONE 1 APPLICATION: 1 CREAM TOPICAL at 15:47

## 2025-03-07 RX ADMIN — SODIUM CHLORIDE 1000 ML: 0.9 INJECTION, SOLUTION INTRAVENOUS at 12:22

## 2025-03-07 RX ADMIN — METOPROLOL TARTRATE 12.5 MG: 25 TABLET, FILM COATED ORAL at 14:19

## 2025-03-07 NOTE — ASSESSMENT & PLAN NOTE
Rash appears consistent with hives. Unclear etiology  Will need steroids- being transferred to ED

## 2025-03-07 NOTE — ASSESSMENT & PLAN NOTE
Orders:  •  POCT urine dip auto non-scope  •  Transfer to other facility  •  Urine culture; Future

## 2025-03-07 NOTE — PROGRESS NOTES
"Name: Peter Davis      : 1955      MRN: [id number]  Encounter Provider: Cherry Hobbs MD  Encounter Date: 3/7/2025   Encounter department: [facility]  :  Assessment & Plan  Atrial fibrillation with rapid ventricular response (HCC)  New onset A Fib with RVR  Advised ER evaluation  Orders:  •  POCT ECG  •  Transfer to other facility    Other fatigue  Multifactorial   Patient in Rapid A Fib, will transfer to ER  Orders:  •  POCT rapid flu A and B  •  POCT Rapid Covid Ag  •  Transfer to other facility    Fever, unspecified fever cause  Flu & COVID negative  Urine with blood, will send culture  Will need further work up in the ER   Orders:  •  POCT rapid flu A and B  •  POCT Rapid Covid Ag  •  Transfer to other facility    Chills  Flu & COVID negative  Will need further work up in ED  Orders:  •  POCT rapid flu A and B  •  POCT Rapid Covid Ag  •  Transfer to other facility    Generalized body aches  Flu & COVID negative  Will need further work up in ED  Orders:  •  POCT rapid flu A and B  •  POCT Rapid Covid Ag  •  Transfer to other facility    Screening for viral disease    Orders:  •  POCT rapid flu A and B  •  POCT Rapid Covid Ag  •  Transfer to other facility    Other microscopic hematuria    Orders:  •  POCT urine dip auto non-scope  •  Transfer to other facility  •  Urine culture; Future    Urticaria  Rash appears consistent with hives. Unclear etiology  Will need steroids- being transferred to ED              History of Present Illness   69 year old here for joint pain, rash and fevers.  Per HPI from Rigoberto Casas, when patient was seen on 25:  \"Pt presents with concerns of years of progressive polyarthralgia. In short, he has been seen for such concerns before and previously was diagnosed with PMR outside of network and had good response to prednisone, then was able to establish with  rheumatology about 3 years ago and was told he has no autoimmune condition and rather " "generalized osteoarthritis. He continues with chronic joint pains and chronic persistent fatigue. He does not sleep well. He has not had a sleep study.\"    He had labs done - ESR and CRP were mildly elevated.    He says he feels very tired all the time, unable to do the things he used to be able to. This has gradually been worsening over the past 6 months, and actually says the joint pains and fatigue have been going on for years.  He says last night at 2 AM his hands became itchy. He developed chills and fever and he noticed white bumps on his arms.     He is noted to be in rapid Afib. He denies any cardiac hx. He does feel weak and has shortness of breath. He does have COPD.      Review of Systems   Constitutional:  Positive for chills, fatigue and fever.   HENT:  Negative for congestion and ear pain.    Respiratory:  Negative for cough, choking and shortness of breath.    Cardiovascular:  Negative for chest pain, palpitations and leg swelling.   Musculoskeletal:  Positive for arthralgias.   Skin:  Positive for rash.       Objective   /70 (BP Location: Left arm, Patient Position: Sitting, Cuff Size: Large)   Pulse 69   Temp (!) 100.7 °F (38.2 °C)   Ht 5' 6\" (1.676 m)   Wt 101 kg (222 lb)   SpO2 98%   BMI 35.83 kg/m²      Physical Exam  Vitals and nursing note reviewed.   Constitutional:       General: He is not in acute distress.     Appearance: He is well-developed. He is not diaphoretic.   HENT:      Head: Normocephalic and atraumatic.      Right Ear: There is impacted cerumen.      Left Ear: There is impacted cerumen.      Mouth/Throat:      Mouth: Mucous membranes are moist.   Cardiovascular:      Rate and Rhythm: Tachycardia present. Rhythm irregular.      Heart sounds: Normal heart sounds. No murmur heard.     No friction rub. No gallop.   Pulmonary:      Effort: Pulmonary effort is normal. Tachypnea (mildly tachypneic) present. No respiratory distress.      Breath sounds: Normal breath sounds. " No stridor. No wheezing or rales.   Skin:     Findings: Rash (see photo) present.   Neurological:      General: No focal deficit present.      Mental Status: He is alert.   Psychiatric:         Mood and Affect: Mood normal.         Behavior: Behavior normal.         Thought Content: Thought content normal.         Judgment: Judgment normal.             EKG: atrial fibrillation, rate 149.
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
## 2025-03-07 NOTE — ASSESSMENT & PLAN NOTE
Multifactorial   Patient in Rapid A Fib, will transfer to ER  Orders:  •  POCT rapid flu A and B  •  POCT Rapid Covid Ag  •  Transfer to other facility

## 2025-03-07 NOTE — ASSESSMENT & PLAN NOTE
-Unsure of etiology of rash present on bilateral hands and small amount of abdomen a raised erythematous, itchy  -Patient allergic    -Will try scheduled Benadryl and topical hydrocortisone  -Consider systemic steroids if fails to improve

## 2025-03-07 NOTE — ASSESSMENT & PLAN NOTE
-Presented with new onset A-fib initial rates of 126  -May account for patient's month history of fatigue  -[id number] or of 1  -TSH recently checked within normal limits    -Continue telemetry  -Hold off anticoagulation given low [id number] score  -Will start Lopressor 12.5 twice daily for rate control  -Cardiology consult  -Echocardiogram

## 2025-03-08 ENCOUNTER — APPOINTMENT (INPATIENT)
Dept: CT IMAGING | Facility: HOSPITAL | Age: 70
DRG: 309 | End: 2025-03-08
Payer: COMMERCIAL

## 2025-03-08 PROBLEM — R65.10 SIRS (SYSTEMIC INFLAMMATORY RESPONSE SYNDROME) (HCC): Status: ACTIVE | Noted: 2025-03-08

## 2025-03-08 LAB
ANION GAP SERPL CALCULATED.3IONS-SCNC: 7 MMOL/L (ref 4–13)
APTT PPP: 38 SECONDS (ref 23–34)
APTT PPP: 40 SECONDS (ref 23–34)
BACTERIA UR CULT: NORMAL
BUN SERPL-MCNC: 16 MG/DL (ref 5–25)
CALCIUM SERPL-MCNC: 8.7 MG/DL (ref 8.4–10.2)
CHLORIDE SERPL-SCNC: 106 MMOL/L (ref 96–108)
CO2 SERPL-SCNC: 24 MMOL/L (ref 21–32)
CREAT SERPL-MCNC: 1.01 MG/DL (ref 0.6–1.3)
ERYTHROCYTE [DISTWIDTH] IN BLOOD BY AUTOMATED COUNT: 12.9 % (ref 11.6–15.1)
ERYTHROCYTE [DISTWIDTH] IN BLOOD BY AUTOMATED COUNT: 13 % (ref 11.6–15.1)
GFR SERPL CREATININE-BSD FRML MDRD: 76 ML/MIN/1.73SQ M
GLUCOSE SERPL-MCNC: 109 MG/DL (ref 65–140)
HCT VFR BLD AUTO: 41.1 % (ref 36.5–49.3)
HCT VFR BLD AUTO: 45.4 % (ref 36.5–49.3)
HGB BLD-MCNC: 13.5 G/DL (ref 12–17)
HGB BLD-MCNC: 14.5 G/DL (ref 12–17)
INR PPP: 1.06 (ref 0.85–1.19)
MCH RBC QN AUTO: 29.5 PG (ref 26.8–34.3)
MCH RBC QN AUTO: 30.1 PG (ref 26.8–34.3)
MCHC RBC AUTO-ENTMCNC: 31.9 G/DL (ref 31.4–37.4)
MCHC RBC AUTO-ENTMCNC: 32.8 G/DL (ref 31.4–37.4)
MCV RBC AUTO: 92 FL (ref 82–98)
MCV RBC AUTO: 92 FL (ref 82–98)
PLATELET # BLD AUTO: 131 THOUSANDS/UL (ref 149–390)
PLATELET # BLD AUTO: 150 THOUSANDS/UL (ref 149–390)
PMV BLD AUTO: 8.7 FL (ref 8.9–12.7)
PMV BLD AUTO: 8.9 FL (ref 8.9–12.7)
POTASSIUM SERPL-SCNC: 4.2 MMOL/L (ref 3.5–5.3)
PROCALCITONIN SERPL-MCNC: 0.4 NG/ML
PROTHROMBIN TIME: 14.5 SECONDS (ref 12.3–15)
RBC # BLD AUTO: 4.48 MILLION/UL (ref 3.88–5.62)
RBC # BLD AUTO: 4.92 MILLION/UL (ref 3.88–5.62)
SODIUM SERPL-SCNC: 137 MMOL/L (ref 135–147)
WBC # BLD AUTO: 8.18 THOUSAND/UL (ref 4.31–10.16)
WBC # BLD AUTO: 9 THOUSAND/UL (ref 4.31–10.16)

## 2025-03-08 PROCEDURE — 74177 CT ABD & PELVIS W/CONTRAST: CPT

## 2025-03-08 PROCEDURE — 80048 BASIC METABOLIC PNL TOTAL CA: CPT

## 2025-03-08 PROCEDURE — 99232 SBSQ HOSP IP/OBS MODERATE 35: CPT | Performed by: NURSE PRACTITIONER

## 2025-03-08 PROCEDURE — 85730 THROMBOPLASTIN TIME PARTIAL: CPT | Performed by: PHYSICIAN ASSISTANT

## 2025-03-08 PROCEDURE — 71260 CT THORAX DX C+: CPT

## 2025-03-08 PROCEDURE — 85610 PROTHROMBIN TIME: CPT | Performed by: PHYSICIAN ASSISTANT

## 2025-03-08 PROCEDURE — 85027 COMPLETE CBC AUTOMATED: CPT | Performed by: PHYSICIAN ASSISTANT

## 2025-03-08 PROCEDURE — 94760 N-INVAS EAR/PLS OXIMETRY 1: CPT

## 2025-03-08 PROCEDURE — 94664 DEMO&/EVAL PT USE INHALER: CPT

## 2025-03-08 PROCEDURE — 84145 PROCALCITONIN (PCT): CPT | Performed by: NURSE PRACTITIONER

## 2025-03-08 PROCEDURE — 85730 THROMBOPLASTIN TIME PARTIAL: CPT | Performed by: FAMILY MEDICINE

## 2025-03-08 PROCEDURE — 94640 AIRWAY INHALATION TREATMENT: CPT

## 2025-03-08 PROCEDURE — 99222 1ST HOSP IP/OBS MODERATE 55: CPT | Performed by: INTERNAL MEDICINE

## 2025-03-08 PROCEDURE — 85027 COMPLETE CBC AUTOMATED: CPT

## 2025-03-08 RX ORDER — HEPARIN SODIUM 10000 [USP'U]/100ML
3-30 INJECTION, SOLUTION INTRAVENOUS
Status: DISCONTINUED | OUTPATIENT
Start: 2025-03-08 | End: 2025-03-10

## 2025-03-08 RX ORDER — AMOXICILLIN 250 MG
1 CAPSULE ORAL
Status: DISCONTINUED | OUTPATIENT
Start: 2025-03-08 | End: 2025-03-10 | Stop reason: HOSPADM

## 2025-03-08 RX ORDER — ACETAMINOPHEN 325 MG/1
650 TABLET ORAL EVERY 6 HOURS PRN
Status: DISCONTINUED | OUTPATIENT
Start: 2025-03-08 | End: 2025-03-10 | Stop reason: HOSPADM

## 2025-03-08 RX ORDER — HEPARIN SODIUM 1000 [USP'U]/ML
3800 INJECTION, SOLUTION INTRAVENOUS; SUBCUTANEOUS EVERY 6 HOURS PRN
Status: DISCONTINUED | OUTPATIENT
Start: 2025-03-08 | End: 2025-03-10

## 2025-03-08 RX ORDER — POLYETHYLENE GLYCOL 3350 17 G/17G
17 POWDER, FOR SOLUTION ORAL DAILY
Status: DISCONTINUED | OUTPATIENT
Start: 2025-03-08 | End: 2025-03-10 | Stop reason: HOSPADM

## 2025-03-08 RX ORDER — HEPARIN SODIUM 1000 [USP'U]/ML
7600 INJECTION, SOLUTION INTRAVENOUS; SUBCUTANEOUS ONCE
Status: COMPLETED | OUTPATIENT
Start: 2025-03-08 | End: 2025-03-08

## 2025-03-08 RX ORDER — BENZONATATE 100 MG/1
100 CAPSULE ORAL 3 TIMES DAILY
Status: DISCONTINUED | OUTPATIENT
Start: 2025-03-08 | End: 2025-03-10 | Stop reason: HOSPADM

## 2025-03-08 RX ORDER — HEPARIN SODIUM 1000 [USP'U]/ML
7600 INJECTION, SOLUTION INTRAVENOUS; SUBCUTANEOUS EVERY 6 HOURS PRN
Status: DISCONTINUED | OUTPATIENT
Start: 2025-03-08 | End: 2025-03-10

## 2025-03-08 RX ADMIN — ENOXAPARIN SODIUM 40 MG: 40 INJECTION SUBCUTANEOUS at 08:28

## 2025-03-08 RX ADMIN — IPRATROPIUM BROMIDE AND ALBUTEROL SULFATE 3 ML: 2.5; .5 SOLUTION RESPIRATORY (INHALATION) at 00:09

## 2025-03-08 RX ADMIN — DIPHENHYDRAMINE HYDROCHLORIDE 25 MG: 25 SOLUTION ORAL at 08:35

## 2025-03-08 RX ADMIN — IOHEXOL 100 ML: 350 INJECTION, SOLUTION INTRAVENOUS at 14:12

## 2025-03-08 RX ADMIN — BENZONATATE 100 MG: 100 CAPSULE ORAL at 16:03

## 2025-03-08 RX ADMIN — DIPHENHYDRAMINE HYDROCHLORIDE 25 MG: 25 SOLUTION ORAL at 16:03

## 2025-03-08 RX ADMIN — HEPARIN SODIUM 7600 UNITS: 1000 INJECTION INTRAVENOUS; SUBCUTANEOUS at 17:05

## 2025-03-08 RX ADMIN — HEPARIN SODIUM 7600 UNITS: 1000 INJECTION, SOLUTION INTRAVENOUS; SUBCUTANEOUS at 09:54

## 2025-03-08 RX ADMIN — DIPHENHYDRAMINE HYDROCHLORIDE 25 MG: 25 SOLUTION ORAL at 03:15

## 2025-03-08 RX ADMIN — HEPARIN SODIUM 18 UNITS/KG/HR: 10000 INJECTION, SOLUTION INTRAVENOUS at 09:55

## 2025-03-08 RX ADMIN — TAMSULOSIN HYDROCHLORIDE 0.4 MG: 0.4 CAPSULE ORAL at 16:03

## 2025-03-08 RX ADMIN — NICOTINE 1 PATCH: 7 PATCH, EXTENDED RELEASE TRANSDERMAL at 08:28

## 2025-03-08 RX ADMIN — POLYETHYLENE GLYCOL 3350 17 G: 17 POWDER, FOR SOLUTION ORAL at 11:46

## 2025-03-08 RX ADMIN — DIPHENHYDRAMINE HYDROCHLORIDE 25 MG: 25 SOLUTION ORAL at 21:32

## 2025-03-08 RX ADMIN — ACETAMINOPHEN 650 MG: 325 TABLET, FILM COATED ORAL at 19:29

## 2025-03-08 RX ADMIN — UMECLIDINIUM BROMIDE AND VILANTEROL TRIFENATATE 1 PUFF: 62.5; 25 POWDER RESPIRATORY (INHALATION) at 08:35

## 2025-03-08 RX ADMIN — BENZONATATE 100 MG: 100 CAPSULE ORAL at 21:32

## 2025-03-08 RX ADMIN — SENNOSIDES AND DOCUSATE SODIUM 1 TABLET: 50; 8.6 TABLET ORAL at 21:32

## 2025-03-08 NOTE — RESPIRATORY THERAPY NOTE
RT Protocol Note  Peter Davis 69 y.o. male MRN: [id number]  Unit/Bed#: 2 E 270-01 Encounter: [id number]    Assessment    Principal Problem:    A-fib (HCC)  Active Problems:    Chronic obstructive pulmonary disease, unspecified COPD type (HCC)    BPH associated with nocturia    Urticaria      Home Pulmonary Medications:         Past Medical History:   Diagnosis Date    Arthritis     BPH (benign prostatic hyperplasia)     COPD (chronic obstructive pulmonary disease) (HCC)     DVT (deep venous thrombosis) (HCC)     tx w/ blood thinners ( states was associate w/ leg fx )    Neck pain     PMR (polymyalgia rheumatica) ([facility])     Spinal arthritis      Social History     Socioeconomic History    Marital status: /Civil Union     Spouse name: Not on file    Number of children: Not on file    Years of education: Not on file    Highest education level: Not on file   Occupational History    Not on file   Tobacco Use    Smoking status: Every Day     Current packs/day: 0.50     Average packs/day: 0.5 packs/day for 50.2 years (25.1 ttl pk-yrs)     Types: Cigarettes     Start date: 1975    Smokeless tobacco: Never    Tobacco comments:     Working on cutting down, down to 5 cigs daily as of 1/24   Vaping Use    Vaping status: Never Used   Substance and Sexual Activity    Alcohol use: Yes     Alcohol/week: 9.0 standard drinks of alcohol     Types: 8 Cans of beer, 1 Shots of liquor per week    Drug use: Never    Sexual activity: Not on file   Other Topics Concern    Not on file   Social History Narrative    Not on file     Social Drivers of Health     Financial Resource Strain: Low Risk  (10/19/2023)    Overall Financial Resource Strain (CARDIA)     Difficulty of Paying Living Expenses: Not very hard   Food Insecurity: No Food Insecurity (3/7/2025)    Nursing - Inadequate Food Risk Classification     Worried About Running Out of Food in the Last Year: Never true     Ran Out of Food in the Last Year: Never true     Ran Out of  "Food in the Last Year: Never true   Transportation Needs: No Transportation Needs (3/7/2025)    Nursing - Transportation Risk Classification     Lack of Transportation: Not on file     Lack of Transportation: No   Physical Activity: Not on file   Stress: Not on file   Social Connections: Not on file   Intimate Partner Violence: Unknown (3/7/2025)    Nursing IPS     Feels Physically and Emotionally Safe: Not on file     Physically Hurt by Someone: Not on file     Humiliated or Emotionally Abused by Someone: Not on file     Physically Hurt by Someone: No     Hurt or Threatened by Someone: No   Housing Stability: Unknown (3/7/2025)    Nursing: Inadequate Housing Risk Classification     Has Housing: Not on file     Worried About Losing Housing: Not on file     Unable to Get Utilities: Not on file     Unable to Pay for Housing in the Last Year: No     Has Housin       Subjective         Objective    Physical Exam:   Assessment Type: Pre-treatment  General Appearance: Alert, Awake  Respiratory Pattern: Dyspnea with exertion  Chest Assessment: Chest expansion symmetrical  Bilateral Breath Sounds: Diminished    Vitals:  Blood pressure 131/91, pulse 96, temperature 99.1 °F (37.3 °C), resp. rate 18, height 5' 6\" (1.676 m), weight 99.8 kg (220 lb), SpO2 94%.          Imaging and other studies:           Plan             Resp Comments: PRN given. RT to continue with scheduled inhaler and will administrate PRN as needed.      "

## 2025-03-08 NOTE — ASSESSMENT & PLAN NOTE
Evolving shortly after admission with tachycardia, and fever  Did receive 1x dose of IV Ceftriaxone in the ER, received 1 L IVF Bolus  Lactic acid level normal, procalcitonin slightly elevated at 0.41  Blood cultures x 2 were obtained, pending  UA with small leukocytes, with small occult blood and 2-4 WBC  Rapid antigen for covid, influenza negative.  No leukocytosis but did have elevated neutrophil count on CBC  Patient reporting some LLQ discomfort, constipation with some frequency and odor to his urine; will do a CT chest/Abd/Pelvis to rule out any source of infection as he was also hypoxic this morning at 88%.

## 2025-03-08 NOTE — CONSULTS
Consultation - Cardiology   Name: Peter Davis 69 y.o. male I MRN: [id number]  Unit/Bed#: 2 E 270-01 I Date of Admission: 3/7/2025   Date of Service: 3/8/2025 I Hospital Day: 1   Inpatient consult to Cardiology  Consult performed by: Devorah Dobbs PA-C  Consult ordered by: Osmel Delaney DO        Physician Requesting Evaluation: Denny Delanye MD   Reason for Evaluation / Principal Problem: afib    Assessment & Plan  A-fib (HCC)  New onset with RVR on admission   heart rates now in the 80s  Discussed pathophysiology of atrial fibrillation  Discussed treatment plans and medications  Start heparin drip  Continue Lopressor 12.5 twice daily  [id number] = 1  Discussed need for BRYCE and cardioversion on Monday if patient remains in atrial fibrillation  TTE pending  Monitor Tele  Chronic obstructive pulmonary disease, unspecified COPD type (HCC)  Mgmt per SLIM  +Smoker  SIRS (systemic inflammatory response syndrome) (HCC)  Mgmt per SLIM  Unclear etiology  I have discussed the above management plan in detail with the primary service.     History of Present Illness   Peter Davis is a 69 y.o. male who presents with 1 month of increasing weakness.  Patient seen at PCPs office noting muscle pain and weakness and prescribed Voltaren gel.  Patient was back 2 days later with rash and itchiness of the hands.  Rash appeared consistent with hives per PCP note.  Patient noted to be in new onset atrial fibrillation in PCP office and advised to come to the emergency room for further evaluation.  Heart rate was in the 140s.  Patient has no history of A-fib.  Patient also noted to be febrile yesterday.  Patient noted weakness in the upper and lower extremities but still stable for ambulation.  Denies chest pain, chest pressure, chest heaviness.  Denies palpitations.    PMH: COPD, BPH, questionable PMR    Review of Systems   Constitutional:  Positive for fever.   Respiratory: Negative.     Cardiovascular: Negative.    Musculoskeletal:   Positive for arthralgias and myalgias.   Skin:  Positive for rash.   Neurological:  Positive for weakness.   Hematological: Negative.    Psychiatric/Behavioral: Negative.     All other systems reviewed and are negative.    Medical History Review: I have reviewed the patient's PMH, PSH, Social History, Family History, Meds, and Allergies     Objective :  Temp:  [97.5 °F (36.4 °C)-100.9 °F (38.3 °C)] 98.5 °F (36.9 °C)  HR:  [] 89  BP: (108-131)/(66-91) 108/67  Resp:  [18-20] 18  SpO2:  [88 %-97 %] 88 %  O2 Device: None (Room air)  Nasal Cannula O2 Flow Rate (L/min):  [2 L/min] 2 L/min  Orthostatic Blood Pressures      Flowsheet Row Most Recent Value   Blood Pressure 108/67 filed at 03/08/2025 0816   Patient Position - Orthostatic VS Lying filed at 03/07/2025 2100          First Weight: Weight - Scale: 99.8 kg (220 lb) (03/07/25 2100)  Vitals:    03/07/25 2100   Weight: 99.8 kg (220 lb)       Physical Exam  Vitals and nursing note reviewed.   Constitutional:       Appearance: Normal appearance.   HENT:      Head: Normocephalic and atraumatic.   Cardiovascular:      Rate and Rhythm: Normal rate. Rhythm irregular.      Pulses: Normal pulses.      Heart sounds: Normal heart sounds.   Pulmonary:      Effort: Pulmonary effort is normal.      Breath sounds: Normal breath sounds.   Musculoskeletal:         General: Normal range of motion.      Cervical back: Normal range of motion and neck supple.   Skin:     General: Skin is warm and dry.   Neurological:      General: No focal deficit present.      Mental Status: He is alert and oriented to person, place, and time.   Psychiatric:         Mood and Affect: Mood normal.         Behavior: Behavior normal.         Thought Content: Thought content normal.         Judgment: Judgment normal.           Lab Results: I have reviewed the following results:CBC/BMP:   .     03/08/25  0434 03/08/25  1036   WBC 9.00 8.18   HGB 14.5 13.5   HCT 45.4 41.1    131*   SODIUM 137  --   "  K 4.2  --      --    CO2 24  --    BUN 16  --    CREATININE 1.01  --    GLUC 109  --     , Creatinine Clearance: Estimated Creatinine Clearance: 78.5 mL/min (by C-G formula based on SCr of 1.01 mg/dL)., LFTs:   .     03/07/25  1222   AST 18   ALT 25   ALB 4.0   TBILI 0.89   ALKPHOS 82    , PTT/INR:  .     03/07/25  1222   PTT 32   INR 1.03    , Troponin,BNP:No new results in last 24 hours. , Lactic Acid:   .     03/07/25  1222   LACTICACID 1.2      Results from last 7 days   Lab Units 03/08/25  1036 03/08/25  0434 03/07/25  1222   WBC Thousand/uL 8.18 9.00 9.79   HEMOGLOBIN g/dL 13.5 14.5 15.8   HEMATOCRIT % 41.1 45.4 48.0   PLATELETS Thousands/uL 131* 150 204     Results from last 7 days   Lab Units 03/08/25  0434 03/07/25  1222   POTASSIUM mmol/L 4.2 4.2   CHLORIDE mmol/L 106 106   CO2 mmol/L 24 24   BUN mg/dL 16 15   CREATININE mg/dL 1.01 0.89   CALCIUM mg/dL 8.7 9.0     Results from last 7 days   Lab Units 03/07/25  1222   INR  1.03   PTT seconds 32     Lab Results   Component Value Date    HGBA1C 5.5 01/24/2024     No results found for: \"CKTOTAL\", \"CKMB\", \"CKMBINDEX\", \"TROPONINI\"    Imaging Results Review: I reviewed radiology reports from this admission including: chest xray.  Other Study Results Review: EKG was reviewed.       "

## 2025-03-08 NOTE — PLAN OF CARE
Problem: PAIN - ADULT  Goal: Verbalizes/displays adequate comfort level or baseline comfort level  Description: Interventions:  - Encourage patient to monitor pain and request assistance  - Assess pain using appropriate pain scale  - Administer analgesics based on type and severity of pain and evaluate response  - Implement non-pharmacological measures as appropriate and evaluate response  - Consider cultural and social influences on pain and pain management  - Notify physician/advanced practitioner if interventions unsuccessful or patient reports new pain  Outcome: Progressing     Problem: INFECTION - ADULT  Goal: Absence or prevention of progression during hospitalization  Description: INTERVENTIONS:  - Assess and monitor for signs and symptoms of infection  - Monitor lab/diagnostic results  - Monitor all insertion sites, i.e. indwelling lines, tubes, and drains  - Monitor endotracheal if appropriate and nasal secretions for changes in amount and color  - Preston appropriate cooling/warming therapies per order  - Administer medications as ordered  - Instruct and encourage patient and family to use good hand hygiene technique  - Identify and instruct in appropriate isolation precautions for identified infection/condition  Outcome: Progressing  Goal: Absence of fever/infection during neutropenic period  Description: INTERVENTIONS:  - Monitor WBC    Outcome: Progressing     Problem: DISCHARGE PLANNING  Goal: Discharge to home or other facility with appropriate resources  Description: INTERVENTIONS:  - Identify barriers to discharge w/patient and caregiver  - Arrange for needed discharge resources and transportation as appropriate  - Identify discharge learning needs (meds, wound care, etc.)  - Arrange for interpretive services to assist at discharge as needed  - Refer to Case Management Department for coordinating discharge planning if the patient needs post-hospital services based on physician/advanced  practitioner order or complex needs related to functional status, cognitive ability, or social support system  Outcome: Progressing

## 2025-03-08 NOTE — CASE MANAGEMENT
Case Management Assessment & Discharge Planning Note    Patient name Peter Davis  Location [address] [address] MRN [id number]  : 1955 Date 3/8/2025       Current Admission Date: 3/7/2025  Current Admission Diagnosis:A-fib (HCC)   Patient Active Problem List    Diagnosis Date Noted Date Diagnosed    SIRS (systemic inflammatory response syndrome) (HCC) 2025     Other microscopic hematuria 2025     Dysuria 2025     Urticaria 2025     A-fib (HCC) 2025     Obesity (BMI 30.0-34.9) 2024     Sleep disorder 10/19/2023     Excessive daytime sleepiness 10/19/2023     Chronic pain of right knee 10/19/2023     BPH associated with nocturia 10/19/2023     Other fatigue 10/19/2023     Hyperglycemia 10/19/2023     Cigarette nicotine dependence without complication 10/05/2022     Screening for lung cancer 10/05/2022     Chronic obstructive pulmonary disease, unspecified COPD type ([facility]) 10/05/2022     Primary osteoarthritis of right knee 10/05/2022     Other insomnia 10/05/2022     Cervical spondylosis without myelopathy 11/15/2018       LOS (days): 1  Geometric Mean LOS (GMLOS) (days):   Days to GMLOS:     OBJECTIVE:    Risk of Unplanned Readmission Score: 6.79         Current admission status: Inpatient       Preferred Pharmacy:   [facility]/pharmacy #[id number] - [place], [place] - [address]  [address]  [place] [address]  Phone: [phone] Fax: [phone]    Primary Care Provider: Rigoberto Casas PA-C    Primary Insurance: [facility]  Secondary Insurance:     ASSESSMENT:  Active Health Care Proxies       RyanKASANDRA Alternate Health Care Representative - Spouse   Primary Phone: [phone] (Mobile)  Home Phone: [phone]                 Advance Directives  Does patient have a Health Care POA?: No  Was patient offered paperwork?: Yes (Patient declined)  Does patient currently have a Health Care decision maker?: No  Does patient have Advance Directives?:  No  Was patient offered paperwork?: Yes (Patient declined)  Primary Contact: Spouse Steffi - Patient is  from spouse. Please only contact in extreme emergency.         Readmission Root Cause  30 Day Readmission: No    Patient Information  Admitted from:: Home  Mental Status: Alert  During Assessment patient was accompanied by: Not accompanied during assessment  Assessment information provided by:: Patient  Primary Caregiver: Self  Support Systems: Self  County of Residence: [place]  What city do you live in?: [place]  Home entry access options. Select all that apply.: Stairs  Number of steps to enter home.: 3  Do the steps have railings?: Yes  Type of Current Residence: [facility]  Living Arrangements: Lives Alone    Activities of Daily Living Prior to Admission  Functional Status: Independent  Completes ADLs independently?: Yes  Ambulates independently?: Yes  Does patient use assisted devices?: No  Does patient currently own DME?: Yes  What DME does the patient currently own?: [employer]ight [employer]  Does patient have a history of Outpatient Therapy (PT/OT)?: No  Does the patient have a history of Short-Term Rehab?: No  Does patient have a history of HHC?: No  Does patient currently have HHC?: No         Patient Information Continued  Income Source: SSI/SSD  Does patient have prescription coverage?: Yes  Does patient receive dialysis treatments?: No  Does patient have a history of substance abuse?: No  Does patient have a history of Mental Health Diagnosis?: No         Means of Transportation  Means of Transport to Appts:: Drives Self      Social Determinants of Health (SDOH)      Flowsheet Row Most Recent Value   Housing Stability    In the last 12 months, was there a time when you were not able to pay the mortgage or rent on time? N   In the past 12 months, how many times have you moved where you were living? 1   At any time in the past 12 months, were you homeless or living in a shelter (including now)? N    Transportation Needs    In the past 12 months, has lack of transportation kept you from medical appointments or from getting medications? no   In the past 12 months, has lack of transportation kept you from meetings, work, or from getting things needed for daily living? No   Food Insecurity    Within the past 12 months, you worried that your food would run out before you got the money to buy more. Never true   Within the past 12 months, the food you bought just didn't last and you didn't have money to get more. Never true   Utilities    In the past 12 months has the electric, gas, oil, or water company threatened to shut off services in your home? No            DISCHARGE DETAILS:    Discharge planning discussed with:: Patient  Freedom of Choice: Yes  Comments - Freedom of Choice: CM discussed discharge planning and freedom of choice if services are recommended by [facility]. NO CM needs anticipated but patient will need a Lyft to [facility]'s office as his car is parked there and ambulance transported him for doctor's office.  CM contacted family/caregiver?: No- see comments (Patient declined)  Were Treatment Team discharge recommendations reviewed with patient/caregiver?: Yes  Did patient/caregiver verbalize understanding of patient care needs?: Yes  Were patient/caregiver advised of the risks associated with not following Treatment Team discharge recommendations?: Yes         Requested Home Health Care         Is the patient interested in HHC at discharge?: No    DME Referral Provided  Referral made for DME?: No    Other Referral/Resources/Interventions Provided:  Interventions: None Indicated    Would you like to participate in our [facility] service program?  : No - Declined    Treatment Team Recommendation: Home  Discharge Destination Plan:: Home     Patient will need Lyft to [facility] doctors' office to  car.

## 2025-03-08 NOTE — PLAN OF CARE
Problem: PAIN - ADULT  Goal: Verbalizes/displays adequate comfort level or baseline comfort level  Description: Interventions:  - Encourage patient to monitor pain and request assistance  - Assess pain using appropriate pain scale  - Administer analgesics based on type and severity of pain and evaluate response  - Implement non-pharmacological measures as appropriate and evaluate response  - Consider cultural and social influences on pain and pain management  - Notify physician/advanced practitioner if interventions unsuccessful or patient reports new pain  Outcome: Progressing     Problem: INFECTION - ADULT  Goal: Absence or prevention of progression during hospitalization  Description: INTERVENTIONS:  - Assess and monitor for signs and symptoms of infection  - Monitor lab/diagnostic results  - Monitor all insertion sites, i.e. indwelling lines, tubes, and drains  - Monitor endotracheal if appropriate and nasal secretions for changes in amount and color  - [place] appropriate cooling/warming therapies per order  - Administer medications as ordered  - Instruct and encourage patient and family to use good hand hygiene technique  - Identify and instruct in appropriate isolation precautions for identified infection/condition  Outcome: Progressing  Goal: Absence of fever/infection during neutropenic period  Description: INTERVENTIONS:  - Monitor WBC    Outcome: Progressing     Problem: SAFETY ADULT  Goal: Patient will remain free of falls  Description: INTERVENTIONS:  - Educate patient/family on patient safety including physical limitations  - Instruct patient to call for assistance with activity   - Consult OT/PT to assist with strengthening/mobility   - Keep Call bell within reach  - Keep bed low and locked with side rails adjusted as appropriate  - Keep care items and personal belongings within reach  - Initiate and maintain comfort rounds  - Make Fall Risk Sign visible to staff  - Offer Toileting every 2 Hours,  in advance of need  - Initiate/Maintain bed alarm  - Obtain necessary fall risk management equipment:   - Apply yellow socks and bracelet for high fall risk patients  - Consider moving patient to room near nurses station  Outcome: Progressing  Goal: Maintain or return to baseline ADL function  Description: INTERVENTIONS:  -  Assess patient's ability to carry out ADLs; assess patient's baseline for ADL function and identify physical deficits which impact ability to perform ADLs (bathing, care of mouth/teeth, toileting, grooming, dressing, etc.)  - Assess/evaluate cause of self-care deficits   - Assess range of motion  - Assess patient's mobility; develop plan if impaired  - Assess patient's need for assistive devices and provide as appropriate  - Encourage maximum independence but intervene and supervise when necessary  - Involve family in performance of ADLs  - Assess for home care needs following discharge   - Consider OT consult to assist with ADL evaluation and planning for discharge  - Provide patient education as appropriate  Outcome: Progressing  Goal: Maintains/Returns to pre admission functional level  Description: INTERVENTIONS:  - Perform AM-PAC 6 Click Basic Mobility/ Daily Activity assessment daily.  - Set and communicate daily mobility goal to care team and patient/family/caregiver.   - Collaborate with rehabilitation services on mobility goals if consulted  - Perform Range of Motion 3 times a day.  - Reposition patient every 2 hours.  - Dangle patient 3 times a day  - Stand patient 3 times a day  - Ambulate patient 3 times a day  - Out of bed to chair 3 times a day   - Out of bed for meals 3 times a day  - Out of bed for toileting  - Record patient progress and toleration of activity level   Outcome: Progressing     Problem: DISCHARGE PLANNING  Goal: Discharge to home or other facility with appropriate resources  Description: INTERVENTIONS:  - Identify barriers to discharge w/patient and caregiver  -  Arrange for needed discharge resources and transportation as appropriate  - Identify discharge learning needs (meds, wound care, etc.)  - Arrange for interpretive services to assist at discharge as needed  - Refer to Case Management Department for coordinating discharge planning if the patient needs post-hospital services based on physician/advanced practitioner order or complex needs related to functional status, cognitive ability, or social support system  Outcome: Progressing     Problem: Knowledge Deficit  Goal: Patient/family/caregiver demonstrates understanding of disease process, treatment plan, medications, and discharge instructions  Description: Complete learning assessment and assess knowledge base.  Interventions:  - Provide teaching at level of understanding  - Provide teaching via preferred learning methods  Outcome: Progressing

## 2025-03-08 NOTE — UTILIZATION REVIEW
Initial Clinical Review    Admission: Date/Time/Statement:   Admission Orders (From admission, onward)       Ordered        03/07/25 1319  INPATIENT ADMISSION  Once                          Orders Placed This Encounter   Procedures    INPATIENT ADMISSION     Standing Status:   Standing     Number of Occurrences:   1     Level of Care:   Med Surg [16]     Estimated length of stay:   More than 2 Midnights     Certification:   I certify that inpatient services are medically necessary for this patient for a duration of greater than two midnights. See H&P and MD Progress Notes for additional information about the patient's course of treatment.     ED Arrival Information       Expected   3/7/2025     Arrival   3/7/2025 12:05    Acuity   Emergent              Means of arrival   Ambulance    Escorted by   [facility]    Service   Hospitalist    Admission type   Emergency              Arrival complaint   Atrial fibrillation with rapid ventricular response (HCC)             Chief Complaint   Patient presents with    Atrial Fibrillation     Per EMS, patient was reported to have new onset afib today at urgent care while being treated for allergic reaction from voltaren cream. Patient reports SOB , hx of COPD.          Initial Presentation: 69 y.o. male to ED from home w/ PMH of A-fib, COPD, BPH who presents with 1 month of increasing weakness.  He gone to his PCP reporting muscle pains and weakness was prescribed Voltaren gel.  He states 2 days ago he began having itchiness and hand and noticed a rash yesterday on his hands and abdomen that was raised, erythematous, itchy.  He denies any contact to anything out of the usual.  He is a retired  as well been in contact with any vegetation.   ED patient was found to be in new A-fib RVR given 2 doses of Cardizem 10 mg with improvement of heart rates. Labs gross unremarkable Pro-Lucius of eight 0.41. CXR neg . Admitted IP status w/ afib cont tele , start  lopressor , cardiology consult , echo . COPD inhaler . BPH cont flomax . Urticaria try benadryl or topical hydrocortisone .    Anticipated Length of Stay/Certification Statement: Patient will be admitted on an inpatient basis with an anticipated length of stay of greater than 2 midnights secondary to afib.     3/8 IM Note   Cont tele , lopressor , echo f/u . Urticaria started on benadryl and hydrocortisone . F/u BC . UA with small leukocytes, with small occult blood and 2-4 WBC. Rapid antigen for covid, influenza negative. Episode ofg hypoxia this am 88 % . Cont w/ SOB and weakness at baseline . Endorsed LLQ discomfort and constipation x3 days . BS decreased , LLQ tenderness , BLE edema .     3/8 Cardiology Consult   EKG consistent with atrial fibrillation.  Echocardiogram is pending. New onset afib duration unknown . Start iv heparin . consider transesophageal echo followed by cardioversion on Monday.     Date: 3/9  Day 3: Has surpassed a 2nd midnight with active treatments and services.  If remains in afib through Mon will undergo BRYCE/cardioversion . Cont IV heparin . Duonebs . Rash has resolved. Cont IV ceftriaxone for panniculitis . Anticipate discharge in 24-48 hrs to home with home services. . Dec BS , O2 sat 88-94 % 2l ., BLE edema .           ED Treatment-Medication Administration from 03/07/2025 1112 to 03/07/2025 1843         Date/Time Order Dose Route Action     03/07/2025 1222 sodium chloride 0.9 % bolus 1,000 mL 1,000 mL Intravenous New Bag     03/07/2025 1221 diltiazem (CARDIZEM) injection 10 mg 10 mg Intravenous Given     03/07/2025 1249 diltiazem (CARDIZEM) injection 10 mg 10 mg Intravenous Given     03/07/2025 1312 ceftriaxone (ROCEPHIN) 1 g/50 mL in dextrose IVPB 1,000 mg Intravenous New Bag     03/07/2025 1419 nicotine (NICODERM CQ) 7 mg/24hr TD 24 hr patch 1 patch 1 patch Transdermal Medication Applied     03/07/2025 1419 metoprolol tartrate (LOPRESSOR) partial tablet 12.5 mg 12.5 mg Oral Given      03/07/2025 1547 enoxaparin (LOVENOX) subcutaneous injection 40 mg 40 mg Subcutaneous Given     03/07/2025 1547 diphenhydrAMINE (BENADRYL) oral liquid 25 mg 25 mg Oral Given     03/07/2025 1547 hydrocortisone 1 % cream 1 Application Topical Given            Scheduled Medications:  benzonatate, 100 mg, Oral, TID  diphenhydrAMINE, 25 mg, Oral, Q6H  magnesium sulfate, 2 g, Intravenous, Once  metoprolol tartrate, 12.5 mg, Oral, Q12H JOSIANE  nicotine, 1 patch, Transdermal, Daily  polyethylene glycol, 17 g, Oral, Daily  senna-docusate sodium, 1 tablet, Oral, HS  tamsulosin, 0.4 mg, Oral, Daily With Dinner  umeclidinium-vilanterol, 1 puff, Inhalation, Daily      Continuous IV Infusions:  heparin (porcine), 3-30 Units/kg/hr (Order-Specific), Intravenous, Titrated      PRN Meds:  acetaminophen, 650 mg, Oral, Q6H PRN  heparin (porcine), 3,800 Units, Intravenous, Q6H PRN  heparin (porcine), 7,600 Units, Intravenous, Q6H PRN  hydrocortisone, , Topical, 4x Daily PRN  ipratropium-albuterol, 3 mL, Nebulization, Q6H PRN      ED Triage Vitals   Temperature Pulse Respirations Blood Pressure SpO2 Pain Score   03/07/25 1226 03/07/25 1210 03/07/25 1210 03/07/25 1210 03/07/25 1210 03/07/25 1847   98.8 °F (37.1 °C) (!) 126 20 118/66 93 % 8     Weight (last 2 days)       Date/Time Weight    03/07/25 2100 99.8 (220)            Vital Signs (last 3 days)       Date/Time Temp Pulse Resp BP MAP (mmHg) SpO2 Calculated FIO2 (%) - Nasal Cannula Nasal Cannula O2 Flow Rate (L/min) O2 Device Patient Position - Orthostatic VS Pain    03/09/25 09:02:03 -- 82 -- 109/92 98 88 % -- -- -- -- --    03/09/25 0851 -- -- -- 109/92 -- -- -- -- -- -- --    03/09/25 03:35:04 97.9 °F (36.6 °C) 80 18 117/58 78 94 % 28 2 L/min Nasal cannula -- No Pain    03/08/25 22:57:51 97.9 °F (36.6 °C) 80 18 110/68 82 93 % 28 2 L/min Nasal cannula -- No Pain    03/08/25 21:34:56 -- 89 -- 99/61 74 92 % -- -- -- -- --    03/08/25 2132 -- 94 -- 99/61 -- -- -- -- -- -- --     03/08/25 2100 -- 98 -- -- -- 90 % 28 2 L/min Nasal cannula -- --    03/08/25 2000 -- -- -- -- -- 86 % -- -- None (Room air) -- No Pain    03/08/25 1929 -- -- -- -- -- -- -- -- -- -- Med Not Given for Pain - for MAR use only    03/08/25 1909 101.3 °F (38.5 °C) 103 18 121/77 92 88 % -- -- -- -- --    03/08/25 15:24:33 98.1 °F (36.7 °C) 101 -- 141/86 104 94 % -- -- -- -- --    03/08/25 15:24:20 98.1 °F (36.7 °C) 99 -- 141/86 104 92 % -- -- -- -- --    03/08/25 14:33:14 98.7 °F (37.1 °C) 102 -- -- -- 92 % -- -- -- -- --    03/08/25 12:02:02 98.3 °F (36.8 °C) 85 -- 124/71 89 92 % -- -- -- -- --    03/08/25 12:01:57 98.3 °F (36.8 °C) 83 -- 124/71 89 92 % -- -- -- -- --    03/08/25 1100 98.3 °F (36.8 °C) -- 18 124/71 89 -- -- -- None (Room air) Lying --    03/08/25 1033 -- -- -- -- -- -- -- -- None (Room air) -- No Pain    03/08/25 1028 -- -- -- -- -- 88 % -- -- -- -- --    03/08/25 08:16:24 98.5 °F (36.9 °C) 89 -- 108/67 81 93 % -- -- -- -- --    03/08/25 02:57:02 99.4 °F (37.4 °C) 88 18 124/86 99 92 % -- -- -- -- --    03/08/25 0011 -- -- -- -- -- 94 % 28 2 L/min Nasal cannula -- --    03/07/25 23:51:33 99.1 °F (37.3 °C) 96 -- -- -- 94 % -- -- -- -- --    03/07/25 2153 -- -- -- -- -- -- -- -- -- -- No Pain    03/07/25 21:45:28 100.2 °F (37.9 °C) 108 18 131/91 104 96 % -- -- -- -- --    03/07/25 2145 -- -- -- -- -- -- -- -- -- -- Med Not Given for Pain - for MAR use only    03/07/25 21:32:52 100.9 °F (38.3 °C) 108 -- 131/91 104 94 % -- -- -- -- --    03/07/25 2100 100.9 °F (38.3 °C) 103 18 131/91 104 94 % -- -- None (Room air) Lying --    03/07/25 18:51:11 97.5 °F (36.4 °C) -- -- 117/72 87 -- -- -- -- -- --    03/07/25 1847 -- -- -- -- -- -- -- -- -- -- 8    03/07/25 1228 -- 106 18 -- -- 97 % -- -- -- -- --    03/07/25 1226 98.8 °F (37.1 °C) -- -- -- -- -- -- -- -- -- --    03/07/25 1210 -- 126 20 118/66 -- 93 % -- -- None (Room air) -- --              Pertinent Labs/Diagnostic Test Results:   Radiology:  CT chest abdomen  pelvis w contrast   Final Interpretation by Bogdan Espana DO (03/08 9421)      1.  No acute CT findings in the chest, abdomen or pelvis to account for fever.      2.  Stable tiny pulmonary micronodules.      3.  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 Typically, this is self-limited, incidental finding. Consideration for follow-up CT abdomen in 4-6 months to ensure stability may be    based on clinical grounds (any constitutional symptoms).      4.  3.4 cm area of mixed groundglass density with rim sclerosis right femoral intertrochanteric region, possibly representing focal fibrous dysplasia or lipo sclerosing myxofibrous tumor. Recommend follow-up right hip radiographs in 6 months to assure    stability.      The study was marked in EPIC for immediate notification and follow-up.         Workstation performed: [id number]         XR chest 1 view portable   Final Interpretation by Aydee Villa MD (03/07 1324)      No acute cardiopulmonary disease.            Workstation performed: [id number]           Cardiology:  ECG 12 lead   Final Result by Kira Bond MD (03/07 1341)   Atrial fibrillation with a competing junctional pacemaker with premature    ventricular or aberrantly conducted complexes   Rightward axis   Incomplete right bundle branch block   Abnormal ECG      Confirmed by Kira Bond [id number]) on 3/7/2025 1:41:34 PM      ECG 12 lead   Final Result by Kira Bond MD (03/07 1342)   Atrial fibrillation with rapid ventricular response with premature    ventricular or aberrantly conducted complexes   Right axis deviation   Incomplete right bundle branch block   Abnormal ECG      Confirmed by Kira Bond [id number]) on 3/7/2025 1:42:05 PM      ECG 12 lead   Final Result by Kira Bond MD (03/07 1342)   Atrial fibrillation with rapid ventricular response with premature    ventricular or aberrantly conducted  complexes   Rightward axis   Incomplete right bundle branch block   Abnormal ECG      Confirmed by Kira Bond [id number]) on 3/7/2025 1:42:46 PM        GI:  No orders to display           Results from last 7 days   Lab Units 03/09/25 0429 03/08/25  1036 03/08/25  0434 03/07/25  1222 03/05/25  0946   WBC Thousand/uL 9.82 8.18 9.00 9.79 7.01   HEMOGLOBIN g/dL 13.4 13.5 14.5 15.8 15.0   HEMATOCRIT % 40.9 41.1 45.4 48.0 45.9   PLATELETS Thousands/uL 113* 131* 150 204 251   TOTAL NEUT ABS Thousands/µL  --   --   --  7.68* 4.39         Results from last 7 days   Lab Units 03/09/25 0429 03/08/25 0434 03/07/25  1222   SODIUM mmol/L 137 137 139   POTASSIUM mmol/L 3.6 4.2 4.2   CHLORIDE mmol/L 105 106 106   CO2 mmol/L 25 24 24   ANION GAP mmol/L 7 7 9   BUN mg/dL 13 16 15   CREATININE mg/dL 0.97 1.01 0.89   EGFR ml/min/1.73sq m 80 76 88   CALCIUM mg/dL 8.5 8.7 9.0   MAGNESIUM mg/dL 1.8*  --   --      Results from last 7 days   Lab Units 03/07/25  1222   AST U/L 18   ALT U/L 25   ALK PHOS U/L 82   TOTAL PROTEIN g/dL 6.8   ALBUMIN g/dL 4.0   TOTAL BILIRUBIN mg/dL 0.89       Results from last 7 days   Lab Units 03/09/25 0429 03/08/25  0434 03/07/25  1222   GLUCOSE RANDOM mg/dL 103 109 112           Results from last 7 days   Lab Units 03/09/25 0438 03/08/25  2335 03/08/25  1606 03/08/25  1138 03/07/25  1222   PROTIME seconds  --   --   --  14.5 14.2   INR   --   --   --  1.06 1.03   PTT seconds 190* 48* 38* 40* 32           Results from last 7 days   Lab Units 03/09/25 0429 03/08/25  0434 03/07/25  1222   PROCALCITONIN ng/ml 0.35* 0.40* 0.41*     Results from last 7 days   Lab Units 03/07/25  1222   LACTIC ACID mmol/L 1.2       Results from last 7 days   Lab Units 03/09/25  0429 03/05/25  0946   CRP mg/L 112.4*  --    SED RATE mm/hour 38* 23*       Results from last 7 days   Lab Units 03/07/25  1312 03/07/25  1101   CLARITY UA  Clear  --    COLOR UA  Yellow  --    SPEC GRAV UA  1.030  --    PH UA  5.5  --    GLUCOSE UA  mg/dl Negative Negative   KETONES UA mg/dl Negative Negative   BLOOD UA  Small* +-10 Luis Fernando/uL   PROTEIN UA mg/dl Trace* Negative   NITRITE UA  Negative Negative   BILIRUBIN UA  Negative  --    BILIRUBIN UA POC   --  Negative   UROBILINOGEN UA   --  0.2mg/dL   UROBILINOGEN UA (BE) mg/dl <2.0  --    LEUKOCYTES UA  Small* Negative   WBC UA /hpf 2-4*  --    RBC UA /hpf 1-2  --    BACTERIA UA /hpf None Seen  --    EPITHELIAL CELLS WET PREP /hpf None Seen  --    MUCUS THREADS  Occasional*  --            Results from last 7 days   Lab Units 03/07/25  1222 03/07/25  1153   BLOOD CULTURE  No Growth at 24 hrs.  No Growth at 24 hrs.  --    URINE CULTURE   --  No Growth <1000 cfu/mL       Past Medical History:   Diagnosis Date    Arthritis     BPH (benign prostatic hyperplasia)     COPD (chronic obstructive pulmonary disease) (HCC)     DVT (deep venous thrombosis) ([facility])     tx w/ blood thinners ( states was associate w/ leg fx )    Neck pain     PMR (polymyalgia rheumatica) ([facility])     Spinal arthritis      Present on Admission:   COPD, severe (HCC)   BPH associated with nocturia   A-fib (HCC)   Urticaria   SIRS (systemic inflammatory response syndrome) ([facility])   Cigarette nicotine dependence without complication      Admitting Diagnosis: Pneumonia [J18.9]  Atrial fibrillation with rapid ventricular response ([facility]) [I48.91]  Age/Sex: 69 y.o. male    Network Utilization Review Department  ATTENTION: Please call with any questions or concerns to [phone] and carefully listen to the prompts so that you are directed to the right person. All voicemails are confidential.   For Discharge needs, contact Care Management DC Support Team at [phone] opt. [phone]  Send all requests for admission clinical reviews, approved or denied determinations and any other requests to dedicated fax number below belonging to the campus where the patient is receiving treatment. List of dedicated fax numbers for the Facilities:  FACILITY NAME UR FAX NUMBER    ADMISSION DENIALS (Administrative/Medical Necessity) [phone]   DISCHARGE SUPPORT TEAM (NETWORK) [phone]   PARENT CHILD HEALTH (Maternity/NICU/Pediatrics) [phone]   [facility] [phone]   [facility] [phone]   [facility] [phone]   [facility] [phone]   Counts include 234 beds at the [facility] [phone]   [facility] [phone]   [facility] [phone]   [facility] [phone]   [facility] [phone]   [facility] [phone]   [facility] [phone]   [facility] [phone]

## 2025-03-08 NOTE — RESPIRATORY THERAPY NOTE
RT Protocol Note  Peter Davis 69 y.o. male MRN: [id number]  Unit/Bed#: 2 E 270-01 Encounter: [id number]    Assessment    Principal Problem:    A-fib ([facility])  Active Problems:    Chronic obstructive pulmonary disease, unspecified COPD type (HCC)    BPH associated with nocturia    Urticaria    SIRS (systemic inflammatory response syndrome) ([facility])      Home Pulmonary Medications:         Past Medical History:   Diagnosis Date    Arthritis     BPH (benign prostatic hyperplasia)     COPD (chronic obstructive pulmonary disease) ([facility])     DVT (deep venous thrombosis) ([facility])     tx w/ blood thinners ( states was associate w/ leg fx )    Neck pain     PMR (polymyalgia rheumatica) ([facility])     Spinal arthritis      Social History     Socioeconomic History    Marital status: /Civil Union     Spouse name: Not on file    Number of children: Not on file    Years of education: Not on file    Highest education level: Not on file   Occupational History    Not on file   Tobacco Use    Smoking status: Every Day     Current packs/day: 0.50     Average packs/day: 0.5 packs/day for 50.2 years (25.1 ttl pk-yrs)     Types: Cigarettes     Start date: 1975    Smokeless tobacco: Never    Tobacco comments:     Working on cutting down, down to 5 cigs daily as of 1/24   Vaping Use    Vaping status: Never Used   Substance and Sexual Activity    Alcohol use: Yes     Alcohol/week: 9.0 standard drinks of alcohol     Types: 8 Cans of beer, 1 Shots of liquor per week    Drug use: Never    Sexual activity: Not on file   Other Topics Concern    Not on file   Social History Narrative    Not on file     Social Drivers of Health     Financial Resource Strain: Low Risk  (10/19/2023)    Overall Financial Resource Strain (CARDIA)     Difficulty of Paying Living Expenses: Not very hard   Food Insecurity: No Food Insecurity (3/7/2025)    Nursing - Inadequate Food Risk Classification     Worried About Running Out of Food in the Last Year: Never true     Ran  "Out of Food in the Last Year: Never true     Ran Out of Food in the Last Year: Never true   Transportation Needs: No Transportation Needs (3/7/2025)    Nursing - Transportation Risk Classification     Lack of Transportation: Not on file     Lack of Transportation: No   Physical Activity: Not on file   Stress: Not on file   Social Connections: Not on file   Intimate Partner Violence: Unknown (3/7/2025)    Nursing IPS     Feels Physically and Emotionally Safe: Not on file     Physically Hurt by Someone: Not on file     Humiliated or Emotionally Abused by Someone: Not on file     Physically Hurt by Someone: No     Hurt or Threatened by Someone: No   Housing Stability: Unknown (3/7/2025)    Nursing: Inadequate Housing Risk Classification     Has Housing: Not on file     Worried About Losing Housing: Not on file     Unable to Get Utilities: Not on file     Unable to Pay for Housing in the Last Year: No     Has Housin       Subjective         Objective    Physical Exam:   Assessment Type: Assess only  General Appearance: Awake, Alert  Respiratory Pattern: Normal  Chest Assessment: Chest expansion symmetrical  Bilateral Breath Sounds: Diminished    Vitals:  Blood pressure 108/67, pulse 89, temperature 98.5 °F (36.9 °C), resp. rate 18, height 5' 6\" (1.676 m), weight 99.8 kg (220 lb), SpO2 (!) 88%.          Imaging and other studies:           Plan    Respiratory Plan: Home Bronchodilator Patient pathway        Resp Comments: pt with hx of copd, will continue with prn duoneb   "

## 2025-03-08 NOTE — PROGRESS NOTES
Progress Note - Hospitalist   Name: Peter Davis 69 y.o. male I MRN: [id number]  Unit/Bed#: 2 E 270-01 I Date of Admission: 3/7/2025   Date of Service: 3/8/2025 I Hospital Day: 1    Assessment & Plan  A-fib (HCC)  Patient presented to the ED with complaints of weakness and fatigue; had been seen and evaluated by his PCP in the office, given Voltaren gel.  Found to be in a-fib with rates in the 120s on admission  [id number]-Cope score 1  Cardiology consulted, appreciate input  Monitor on telemetry  Continue with 12.5 mg Lopressor BID at this time  Echocardiogram ordered  Chronic obstructive pulmonary disease, unspecified COPD type (HCC)  Noted history  Continue home medication regimen  BPH associated with nocturia  Continue Flomax  Urticaria  Present on admission, raised erythematous rash with pruritus to bilateral arms/hands, and abdomen  Exact etiology unclear  Initiated on Benadryl, Hydrocortisone cream  SIRS (systemic inflammatory response syndrome) (HCC)  Evolving shortly after admission with tachycardia, and fever  Did receive 1x dose of IV Ceftriaxone in the ER, received 1 L IVF Bolus  Lactic acid level normal, procalcitonin slightly elevated at 0.41  Blood cultures x 2 were obtained, pending  UA with small leukocytes, with small occult blood and 2-4 WBC  Rapid antigen for covid, influenza negative.  No leukocytosis but did have elevated neutrophil count on CBC  Patient reporting some LLQ discomfort, constipation with some frequency and odor to his urine; will do a CT chest/Abd/Pelvis to rule out any source of infection as he was also hypoxic this morning at 88%.    VTE Pharmacologic Prophylaxis: VTE Score: 3 Moderate Risk (Score 3-4) - Pharmacological DVT Prophylaxis Ordered: enoxaparin (Lovenox).    Mobility:   Basic Mobility Inpatient Raw Score: 22  JH-HLM Goal: 7: Walk 25 feet or more  JH-HLM Achieved: 6: Walk 10 steps or more  JH-HLM Goal NOT achieved. Continue with multidisciplinary rounding and encourage  appropriate mobility to improve upon [facility] goals.    Patient Centered Rounds: I performed bedside rounds with nursing staff today.   Discussions with Specialists or Other Care Team Provider:     Education and Discussions with Family / Patient: Patient declined call to .     Current Length of Stay: 1 day(s)  Current Patient Status: Inpatient   Certification Statement: The patient will continue to require additional inpatient hospital stay due to ongoing treatment in setting of a-fib work-up, as well as SIRS work-up.  Discharge Plan: Anticipate discharge in 48-72 hrs to home with home services.    Code Status: Level 1 - Full Code    Subjective   Patient resting in bed, reports he is feeling a little better than when he came in but still reporting some shortness of breath and weakness at baseline.  He also endorsed LLQ discomfort and reports constipation x 3 days which is not normal for him.  He also endorses that the rash is improving and that he is not experiencing the itching like he had been.    Objective :  Temp:  [97.5 °F (36.4 °C)-100.9 °F (38.3 °C)] 98.5 °F (36.9 °C)  HR:  [] 89  BP: (108-131)/(66-91) 108/67  Resp:  [18-20] 18  SpO2:  [88 %-97 %] 88 %  O2 Device: None (Room air)  Nasal Cannula O2 Flow Rate (L/min):  [2 L/min] 2 L/min    Body mass index is 35.51 kg/m².     Input and Output Summary (last 24 hours):     Intake/Output Summary (Last 24 hours) at 3/8/2025 1041  Last data filed at 3/8/2025 0955  Gross per 24 hour   Intake 0 ml   Output --   Net 0 ml       Physical Exam  Vitals and nursing note reviewed.   Constitutional:       General: He is not in acute distress.     Appearance: He is obese. He is ill-appearing.   Cardiovascular:      Rate and Rhythm: Normal rate.      Pulses: Normal pulses.      Heart sounds: Normal heart sounds.   Pulmonary:      Effort: Pulmonary effort is normal. No tachypnea, bradypnea or respiratory distress.      Breath sounds: Decreased breath sounds  present.      Comments: RA 90%  Abdominal:      General: Bowel sounds are normal. There is no distension.      Palpations: Abdomen is soft.      Tenderness: There is abdominal tenderness (LLQ).   Musculoskeletal:         General: Normal range of motion.      Right lower leg: Edema present.      Left lower leg: Edema present.   Skin:     General: Skin is warm and dry.      Comments: Skin is noted to be dry, flakey.  Rash to bilateral arms, abd is improving, and patient reports improvement in pruritus   Neurological:      Mental Status: He is alert and oriented to person, place, and time.   Psychiatric:         Mood and Affect: Mood normal.           Lines/Drains:        Telemetry:  Telemetry Orders (From admission, onward)               24 Hour Telemetry Monitoring  Continuous x 24 Hours (Telem)        Expiring   Question:  Reason for 24 Hour Telemetry  Answer:  Arrhythmias requiring acute medical intervention / PPM or ICD malfunction                     Telemetry Reviewed: Atrial fibrillation. HR averaging   Indication for Continued Telemetry Use: Arrthymias requiring medical therapy               Lab Results: I have reviewed the following results:   Results from last 7 days   Lab Units 03/08/25  0434 03/07/25  1222   WBC Thousand/uL 9.00 9.79   HEMOGLOBIN g/dL 14.5 15.8   HEMATOCRIT % 45.4 48.0   PLATELETS Thousands/uL 150 204   SEGS PCT %  --  78*   LYMPHO PCT %  --  9*   MONO PCT %  --  11   EOS PCT %  --  2     Results from last 7 days   Lab Units 03/08/25  0434 03/07/25  1222   SODIUM mmol/L 137 139   POTASSIUM mmol/L 4.2 4.2   CHLORIDE mmol/L 106 106   CO2 mmol/L 24 24   BUN mg/dL 16 15   CREATININE mg/dL 1.01 0.89   ANION GAP mmol/L 7 9   CALCIUM mg/dL 8.7 9.0   ALBUMIN g/dL  --  4.0   TOTAL BILIRUBIN mg/dL  --  0.89   ALK PHOS U/L  --  82   ALT U/L  --  25   AST U/L  --  18   GLUCOSE RANDOM mg/dL 109 112     Results from last 7 days   Lab Units 03/07/25  1222   INR  1.03             Results from last 7  days   Lab Units 03/08/25  0434 03/07/25  1222   LACTIC ACID mmol/L  --  1.2   PROCALCITONIN ng/ml 0.40* 0.41*       Recent Cultures (last 7 days):   Results from last 7 days   Lab Units 03/07/25  1222   BLOOD CULTURE  Received in Microbiology Lab. Culture in Progress.  Received in Microbiology Lab. Culture in Progress.       Imaging Results Review: I reviewed radiology reports from this admission including: chest xray.  Other Study Results Review: No additional pertinent studies reviewed.    Last 24 Hours Medication List:     Current Facility-Administered Medications:     acetaminophen (TYLENOL) tablet 650 mg, Q6H PRN    diphenhydrAMINE (BENADRYL) oral liquid 25 mg, Q6H    heparin (porcine) 25,000 units in 0.45% NaCl 250 mL infusion (premix), Titrated, Last Rate: 18 Units/kg/hr (03/08/25 0955)    heparin (porcine) injection 3,800 Units, Q6H PRN    heparin (porcine) injection 7,600 Units, Q6H PRN    hydrocortisone 1 % cream, 4x Daily PRN    ipratropium-albuterol (DUO-NEB) 0.5-2.5 mg/3 mL inhalation solution 3 mL, Q6H PRN    metoprolol tartrate (LOPRESSOR) partial tablet 12.5 mg, Q12H JOSIANE    nicotine (NICODERM CQ) 7 mg/24hr TD 24 hr patch 1 patch, Daily    polyethylene glycol (MIRALAX) packet 17 g, Daily    senna-docusate sodium (SENOKOT S) 8.6-50 mg per tablet 1 tablet, HS    tamsulosin (FLOMAX) capsule 0.4 mg, Daily With Dinner    umeclidinium-vilanterol 62.5-25 mcg/actuation inhaler 1 puff, Daily    Administrative Statements   Today, Patient Was Seen By: TIFFANIE Rowell  I have spent a total time of 42 minutes in caring for this patient on the day of the visit/encounter including Diagnostic results, Risks and benefits of tx options, Instructions for management, Patient and family education, Importance of tx compliance, Risk factor reductions, Impressions, Counseling / Coordination of care, Documenting in the medical record, Reviewing/placing orders in the medical record (including tests, medications,  and/or procedures), Obtaining or reviewing history  , and Communicating with other healthcare professionals .    **Please Note: This note may have been constructed using a voice recognition system.**

## 2025-03-08 NOTE — ASSESSMENT & PLAN NOTE
Present on admission, raised erythematous rash with pruritus to bilateral arms/hands, and abdomen  Exact etiology unclear  Initiated on Benadryl, Hydrocortisone cream

## 2025-03-08 NOTE — ASSESSMENT & PLAN NOTE
Patient presented to the ED with complaints of weakness and fatigue; had been seen and evaluated by his PCP in the office, given Voltaren gel.  Found to be in a-fib with rates in the 120s on admission  [id number] score 1  Cardiology consulted, appreciate input  Monitor on telemetry  Continue with 12.5 mg Lopressor BID at this time  Echocardiogram ordered

## 2025-03-08 NOTE — ASSESSMENT & PLAN NOTE
New onset with RVR on admission   heart rates now in the 80s  Discussed pathophysiology of atrial fibrillation  Discussed treatment plans and medications  Start heparin drip  Continue Lopressor 12.5 twice daily  [id number] = 1  Discussed need for BRYCE and cardioversion on Monday if patient remains in atrial fibrillation  TTE pending  Monitor Tele

## 2025-03-09 ENCOUNTER — RESULTS FOLLOW-UP (OUTPATIENT)
Dept: FAMILY MEDICINE CLINIC | Facility: CLINIC | Age: 70
End: 2025-03-09

## 2025-03-09 PROBLEM — K65.4 MESENTERIC PANNICULITIS (HCC): Status: ACTIVE | Noted: 2025-03-09

## 2025-03-09 LAB
ANION GAP SERPL CALCULATED.3IONS-SCNC: 7 MMOL/L (ref 4–13)
APTT PPP: 143 SECONDS (ref 23–34)
APTT PPP: 190 SECONDS (ref 23–34)
APTT PPP: 48 SECONDS (ref 23–34)
APTT PPP: 72 SECONDS (ref 23–34)
BUN SERPL-MCNC: 13 MG/DL (ref 5–25)
CALCIUM SERPL-MCNC: 8.5 MG/DL (ref 8.4–10.2)
CHLORIDE SERPL-SCNC: 105 MMOL/L (ref 96–108)
CO2 SERPL-SCNC: 25 MMOL/L (ref 21–32)
CREAT SERPL-MCNC: 0.97 MG/DL (ref 0.6–1.3)
CRP SERPL QL: 112.4 MG/L
ERYTHROCYTE [DISTWIDTH] IN BLOOD BY AUTOMATED COUNT: 12.8 % (ref 11.6–15.1)
ERYTHROCYTE [SEDIMENTATION RATE] IN BLOOD: 38 MM/HOUR (ref 0–19)
GFR SERPL CREATININE-BSD FRML MDRD: 80 ML/MIN/1.73SQ M
GLUCOSE SERPL-MCNC: 103 MG/DL (ref 65–140)
HCT VFR BLD AUTO: 40.9 % (ref 36.5–49.3)
HGB BLD-MCNC: 13.4 G/DL (ref 12–17)
MAGNESIUM SERPL-MCNC: 1.8 MG/DL (ref 1.9–2.7)
MCH RBC QN AUTO: 29.8 PG (ref 26.8–34.3)
MCHC RBC AUTO-ENTMCNC: 32.8 G/DL (ref 31.4–37.4)
MCV RBC AUTO: 91 FL (ref 82–98)
PLATELET # BLD AUTO: 113 THOUSANDS/UL (ref 149–390)
PMV BLD AUTO: 9 FL (ref 8.9–12.7)
POTASSIUM SERPL-SCNC: 3.6 MMOL/L (ref 3.5–5.3)
PROCALCITONIN SERPL-MCNC: 0.35 NG/ML
RBC # BLD AUTO: 4.49 MILLION/UL (ref 3.88–5.62)
SODIUM SERPL-SCNC: 137 MMOL/L (ref 135–147)
WBC # BLD AUTO: 9.82 THOUSAND/UL (ref 4.31–10.16)

## 2025-03-09 PROCEDURE — 93005 ELECTROCARDIOGRAM TRACING: CPT

## 2025-03-09 PROCEDURE — 86140 C-REACTIVE PROTEIN: CPT | Performed by: NURSE PRACTITIONER

## 2025-03-09 PROCEDURE — 99233 SBSQ HOSP IP/OBS HIGH 50: CPT | Performed by: NURSE PRACTITIONER

## 2025-03-09 PROCEDURE — 83735 ASSAY OF MAGNESIUM: CPT | Performed by: NURSE PRACTITIONER

## 2025-03-09 PROCEDURE — 80048 BASIC METABOLIC PNL TOTAL CA: CPT | Performed by: NURSE PRACTITIONER

## 2025-03-09 PROCEDURE — 84145 PROCALCITONIN (PCT): CPT | Performed by: NURSE PRACTITIONER

## 2025-03-09 PROCEDURE — 85652 RBC SED RATE AUTOMATED: CPT | Performed by: NURSE PRACTITIONER

## 2025-03-09 PROCEDURE — 99232 SBSQ HOSP IP/OBS MODERATE 35: CPT | Performed by: INTERNAL MEDICINE

## 2025-03-09 PROCEDURE — 85730 THROMBOPLASTIN TIME PARTIAL: CPT | Performed by: FAMILY MEDICINE

## 2025-03-09 PROCEDURE — 85027 COMPLETE CBC AUTOMATED: CPT | Performed by: NURSE PRACTITIONER

## 2025-03-09 RX ORDER — MAGNESIUM SULFATE HEPTAHYDRATE 40 MG/ML
2 INJECTION, SOLUTION INTRAVENOUS ONCE
Status: COMPLETED | OUTPATIENT
Start: 2025-03-09 | End: 2025-03-10

## 2025-03-09 RX ADMIN — BENZONATATE 100 MG: 100 CAPSULE ORAL at 15:49

## 2025-03-09 RX ADMIN — POLYETHYLENE GLYCOL 3350 17 G: 17 POWDER, FOR SOLUTION ORAL at 09:03

## 2025-03-09 RX ADMIN — MAGNESIUM SULFATE HEPTAHYDRATE 2 G: 40 INJECTION, SOLUTION INTRAVENOUS at 11:06

## 2025-03-09 RX ADMIN — DIPHENHYDRAMINE HYDROCHLORIDE 25 MG: 25 SOLUTION ORAL at 08:51

## 2025-03-09 RX ADMIN — HEPARIN SODIUM 18 UNITS/KG/HR: 10000 INJECTION, SOLUTION INTRAVENOUS at 23:44

## 2025-03-09 RX ADMIN — UMECLIDINIUM BROMIDE AND VILANTEROL TRIFENATATE 1 PUFF: 62.5; 25 POWDER RESPIRATORY (INHALATION) at 08:51

## 2025-03-09 RX ADMIN — DIPHENHYDRAMINE HYDROCHLORIDE 25 MG: 25 SOLUTION ORAL at 03:53

## 2025-03-09 RX ADMIN — CEFTRIAXONE SODIUM 2000 MG: 10 INJECTION, POWDER, FOR SOLUTION INTRAVENOUS at 09:56

## 2025-03-09 RX ADMIN — DIPHENHYDRAMINE HYDROCHLORIDE 25 MG: 25 SOLUTION ORAL at 14:45

## 2025-03-09 RX ADMIN — HEPARIN SODIUM 3800 UNITS: 1000 INJECTION, SOLUTION INTRAVENOUS; SUBCUTANEOUS at 00:15

## 2025-03-09 RX ADMIN — BENZONATATE 100 MG: 100 CAPSULE ORAL at 21:53

## 2025-03-09 RX ADMIN — NICOTINE 1 PATCH: 7 PATCH, EXTENDED RELEASE TRANSDERMAL at 09:00

## 2025-03-09 RX ADMIN — DIPHENHYDRAMINE HYDROCHLORIDE 25 MG: 25 SOLUTION ORAL at 21:53

## 2025-03-09 RX ADMIN — HEPARIN SODIUM 24 UNITS/KG/HR: 10000 INJECTION, SOLUTION INTRAVENOUS at 03:46

## 2025-03-09 RX ADMIN — TAMSULOSIN HYDROCHLORIDE 0.4 MG: 0.4 CAPSULE ORAL at 15:49

## 2025-03-09 RX ADMIN — BENZONATATE 100 MG: 100 CAPSULE ORAL at 08:51

## 2025-03-09 NOTE — ASSESSMENT & PLAN NOTE
Patient presented to the ED with complaints of weakness and fatigue; had been seen and evaluated by his PCP in the office, given Voltaren gel.  Found to be in a-fib with rates in the 120s on admission  [id number] score 1  Monitor on telemetry  Continue with 12.5 mg Lopressor BID at this time  Echocardiogram ordered  Cardiology following  If patient remains in a-fib through Monday, will undergo BRYCE/Cardioversion.  Remains on IV Heparin for AC

## 2025-03-09 NOTE — PROGRESS NOTES
Progress Note - Cardiology   Name: Peter Davis 69 y.o. male I MRN: [id number]  Unit/Bed#: 2 E 270-01 I Date of Admission: 3/7/2025   Date of Service: 3/9/2025 I Hospital Day: 2    Assessment & Plan  A-fib (HCC)  New onset with RVR on admission   heart rates now in the 80s  Discussed pathophysiology of atrial fibrillation  Discussed treatment plans and medications  Start heparin drip  Continue Lopressor 12.5 twice daily  [id number] = 1  Discussed need for BRYCE and cardioversion on Monday if patient remains in atrial fibrillation  TTE pending  Monitor Tele  COPD, severe (HCC)  Mgmt per [facility]  +Smoker  SIRS (systemic inflammatory response syndrome) (HCC)  Mgmt per [facility]  Unclear etiology  On antibiotics  Cigarette nicotine dependence without complication  Smoking cessation advised    Subjective   Chief Complaint: I am feeling okay.  I was hoping to go home today.  Denies chest pain.  I slept pretty good last night.    Objective :  Temp:  [97.9 °F (36.6 °C)-101.3 °F (38.5 °C)] 97.9 °F (36.6 °C)  HR:  [] 82  BP: ()/(58-92) 109/92  Resp:  [18] 18  SpO2:  [86 %-94 %] 88 %  O2 Device: Nasal cannula  Nasal Cannula O2 Flow Rate (L/min):  [2 L/min] 2 L/min  Orthostatic Blood Pressures      Flowsheet Row Most Recent Value   Blood Pressure 109/92 filed at 03/09/2025 0902   Patient Position - Orthostatic VS Lying filed at 03/08/2025 1100          First Weight: Weight - Scale: 99.8 kg (220 lb) (03/07/25 2100)  Vitals:    03/07/25 2100   Weight: 99.8 kg (220 lb)     Physical Exam  Vitals and nursing note reviewed. Exam conducted with a chaperone present.   Constitutional:       Appearance: Normal appearance.   HENT:      Head: Normocephalic and atraumatic.   Cardiovascular:      Rate and Rhythm: Normal rate. Rhythm irregular.      Pulses: Normal pulses.      Heart sounds: Normal heart sounds.   Pulmonary:      Effort: Pulmonary effort is normal.      Breath sounds: Normal breath sounds.   Musculoskeletal:          "General: Normal range of motion.      Cervical back: Normal range of motion and neck supple.   Skin:     General: Skin is warm and dry.   Neurological:      General: No focal deficit present.      Mental Status: He is alert and oriented to person, place, and time.   Psychiatric:         Mood and Affect: Mood normal.         Behavior: Behavior normal.         Thought Content: Thought content normal.         Judgment: Judgment normal.           Lab Results: I have reviewed the following results:CBC/BMP:   .     03/09/25 0429   WBC 9.82   HGB 13.4   HCT 40.9   *   SODIUM 137   K 3.6      CO2 25   BUN 13   CREATININE 0.97   GLUC 103   MG 1.8*    , Creatinine Clearance: Estimated Creatinine Clearance: 81.7 mL/min (by C-G formula based on SCr of 0.97 mg/dL)., LFTs: No new results in last 24 hours. , PTT/INR:  .     03/08/25 1138 03/08/25  1606 03/09/25  0438   PTT 40*   < > 190*   INR 1.06  --   --     < > = values in this interval not displayed.      Results from last 7 days   Lab Units 03/09/25  0429 03/08/25  1036 03/08/25  0434   WBC Thousand/uL 9.82 8.18 9.00   HEMOGLOBIN g/dL 13.4 13.5 14.5   HEMATOCRIT % 40.9 41.1 45.4   PLATELETS Thousands/uL 113* 131* 150     Results from last 7 days   Lab Units 03/09/25  0429 03/08/25  0434 03/07/25  1222   POTASSIUM mmol/L 3.6 4.2 4.2   CHLORIDE mmol/L 105 106 106   CO2 mmol/L 25 24 24   BUN mg/dL 13 16 15   CREATININE mg/dL 0.97 1.01 0.89   CALCIUM mg/dL 8.5 8.7 9.0     Results from last 7 days   Lab Units 03/09/25  0438 03/08/25  2335 03/08/25  1606 03/08/25  1138 03/07/25  1222   INR   --   --   --  1.06 1.03   PTT seconds 190* 48* 38* 40* 32     Lab Results   Component Value Date    HGBA1C 5.5 01/24/2024     No results found for: \"CKTOTAL\", \"CKMB\", \"CKMBINDEX\", \"TROPONINI\"      "

## 2025-03-09 NOTE — QUICK NOTE
I have personally counseled the patient on medication indication, compliance, utilization and side effects. Patient may be discharged home with Anoro inhaler.

## 2025-03-09 NOTE — PROGRESS NOTES
Progress Note - Hospitalist   Name: Peter Davis 69 y.o. male I MRN: [id number]  Unit/Bed#: 2 E 270-01 I Date of Admission: 3/7/2025   Date of Service: 3/9/2025 I Hospital Day: 2    Assessment & Plan  A-fib (HCC)  Patient presented to the ED with complaints of weakness and fatigue; had been seen and evaluated by his PCP in the office, given Voltaren gel.  Found to be in a-fib with rates in the 120s on admission  [id number] score 1  Monitor on telemetry  Continue with 12.5 mg Lopressor BID at this time  Echocardiogram ordered  Cardiology following  If patient remains in a-fib through Monday, will undergo BRYCE/Cardioversion.  Remains on IV Heparin for AC  COPD, severe (HCC)  Noted history, does not appear to have active exacerbation at this time  Follows with [facility]  Continue home medication regimen - Anoro inhaler  Duonebs PRN  BPH associated with nocturia  Continue Flomax  Urticaria  Present on admission, raised erythematous rash with pruritus to bilateral arms/hands, and abdomen  Exact etiology unclear, however, has improved with PO Benadryl  Rash has resolved.  SIRS (systemic inflammatory response syndrome) (HCC)  Evolving shortly after admission with tachycardia, and fever; Lactic acid level normal, procalcitonin slightly elevated at 0.41  Did receive 1x dose of IV Ceftriaxone in the ER, received 1 L IVF Bolus  UA with small leukocytes, with small occult blood and 2-4 WBC; urine culture without growth.  Rapid antigen for covid, influenza negative.  No leukocytosis but did have elevated neutrophil count on CBC  Blood cultures negative x 24 hours.  CT Chest Abd Pelvis (3/8/25): 1. No acute CT findings in the chest, abdomen or pelvis to account for fever. 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  .4, exact etiology for SIRS is unclear but will continue IV Ceftriaxone at this time.  Cigarette nicotine dependence without complication  Continues to smoke, 1/2 ppd  Nicotine patch  Mesenteric  panniculitis (HCC)  Seen on CT imaging  Patient is without any complaints of abdominal pain, nausea/vomiting today.  Does note some constipation  Questionable if this could be cause of fever and elevated CRP level  Will continue with IV Ceftriaxone at this time    VTE Pharmacologic Prophylaxis: VTE Score: 3 Moderate Risk (Score 3-4) - Pharmacological DVT Prophylaxis Ordered: heparin.    Mobility:   Basic Mobility Inpatient Raw Score: 22  JH-HLM Goal: 7: Walk 25 feet or more  JH-HLM Achieved: 7: Walk 25 feet or more  JH-HLM Goal NOT achieved. Continue with multidisciplinary rounding and encourage appropriate mobility to improve upon JH-HLM goals.    Patient Centered Rounds: I performed bedside rounds with nursing staff today.   Discussions with Specialists or Other Care Team Provider:     Education and Discussions with Family / Patient: Attempted to update  (wife) via phone. Unable to contact. Steffi @ [phone]; unable to leave message,  full.    Current Length of Stay: 2 day(s)  Current Patient Status: Inpatient   Certification Statement: The patient will continue to require additional inpatient hospital stay due to ongoing treatment in setting of A-Fib [need for BRYCE/Cardioversion tomorrow] and ongoing infectious work-up  Discharge Plan: Anticipate discharge in 24-48 hrs to home with home services.    Code Status: Level 1 - Full Code    Subjective   Patient resting in bed, reports he is feeling slightly better than yesterday except had a rough night sleeping from coughing.  No other complaints.  He denies any complaints of chest pain or shortness of breath.  Denies abdominal pain, nausea/vomiting.  Still reports constipation but is taking Miralax, Senokot.  No urinary complaints.  Rash is resolved.    Objective :  Temp:  [97.9 °F (36.6 °C)-101.3 °F (38.5 °C)] 97.9 °F (36.6 °C)  HR:  [] 82  BP: ()/(58-92) 109/92  Resp:  [18] 18  SpO2:  [86 %-94 %] 88 %  O2 Device: Nasal cannula  Nasal Cannula  O2 Flow Rate (L/min):  [2 L/min] 2 L/min    Body mass index is 35.51 kg/m².     Input and Output Summary (last 24 hours):     Intake/Output Summary (Last 24 hours) at 3/9/2025 1123  Last data filed at 3/9/2025 0557  Gross per 24 hour   Intake 1118.03 ml   Output 450 ml   Net 668.03 ml       Physical Exam  Vitals and nursing note reviewed.   Constitutional:       General: He is not in acute distress.     Appearance: He is obese. He is ill-appearing.   Cardiovascular:      Rate and Rhythm: Normal rate.      Pulses: Normal pulses.      Heart sounds: Normal heart sounds.   Pulmonary:      Effort: Pulmonary effort is normal. No tachypnea, bradypnea or respiratory distress.      Breath sounds: Decreased air movement present. Decreased breath sounds present.      Comments: 2 L O2 via NC, 88-94%  Abdominal:      General: Abdomen is protuberant. Bowel sounds are normal. There is no distension.      Palpations: Abdomen is soft.      Tenderness: There is no abdominal tenderness.   Musculoskeletal:         General: Normal range of motion.      Right lower leg: Edema (npe) present.      Left lower leg: Edema (npe) present.   Skin:     General: Skin is warm and dry.   Neurological:      Mental Status: He is alert and oriented to person, place, and time.   Psychiatric:         Mood and Affect: Mood normal.           Lines/Drains:        Telemetry:  Telemetry Orders (From admission, onward)               24 Hour Telemetry Monitoring  Continuous x 24 Hours (Telem)        Expiring   Question:  Reason for 24 Hour Telemetry  Answer:  Arrhythmias requiring acute medical intervention / PPM or ICD malfunction                     Telemetry Reviewed: Atrial fibrillation. HR averaging 80s  Indication for Continued Telemetry Use: Arrthymias requiring medical therapy               Lab Results: I have reviewed the following results:   Results from last 7 days   Lab Units 03/09/25  0429 03/08/25  0434 03/07/25  1222   WBC Thousand/uL 9.82   < >  9.79   HEMOGLOBIN g/dL 13.4   < > 15.8   HEMATOCRIT % 40.9   < > 48.0   PLATELETS Thousands/uL 113*   < > 204   SEGS PCT %  --   --  78*   LYMPHO PCT %  --   --  9*   MONO PCT %  --   --  11   EOS PCT %  --   --  2    < > = values in this interval not displayed.     Results from last 7 days   Lab Units 03/09/25 0429 03/08/25 0434 03/07/25  1222   SODIUM mmol/L 137   < > 139   POTASSIUM mmol/L 3.6   < > 4.2   CHLORIDE mmol/L 105   < > 106   CO2 mmol/L 25   < > 24   BUN mg/dL 13   < > 15   CREATININE mg/dL 0.97   < > 0.89   ANION GAP mmol/L 7   < > 9   CALCIUM mg/dL 8.5   < > 9.0   ALBUMIN g/dL  --   --  4.0   TOTAL BILIRUBIN mg/dL  --   --  0.89   ALK PHOS U/L  --   --  82   ALT U/L  --   --  25   AST U/L  --   --  18   GLUCOSE RANDOM mg/dL 103   < > 112    < > = values in this interval not displayed.     Results from last 7 days   Lab Units 03/08/25  1138   INR  1.06             Results from last 7 days   Lab Units 03/09/25 0429 03/08/25 0434 03/07/25  1222   LACTIC ACID mmol/L  --   --  1.2   PROCALCITONIN ng/ml 0.35* 0.40* 0.41*       Recent Cultures (last 7 days):   Results from last 7 days   Lab Units 03/07/25  1222 03/07/25  1153   BLOOD CULTURE  No Growth at 24 hrs.  No Growth at 24 hrs.  --    URINE CULTURE   --  No Growth <1000 cfu/mL       Imaging Results Review: I reviewed radiology reports from this admission including: CT chest and CT abdomen/pelvis.  Other Study Results Review: No additional pertinent studies reviewed.    Last 24 Hours Medication List:     Current Facility-Administered Medications:     acetaminophen (TYLENOL) tablet 650 mg, Q6H PRN    benzonatate (TESSALON PERLES) capsule 100 mg, TID    diphenhydrAMINE (BENADRYL) oral liquid 25 mg, Q6H    heparin (porcine) 25,000 units in 0.45% NaCl 250 mL infusion (premix), Titrated, Last Rate: 21 Units/kg/hr (03/09/25 0706)    heparin (porcine) injection 3,800 Units, Q6H PRN    heparin (porcine) injection 7,600 Units, Q6H PRN    hydrocortisone 1  % cream, 4x Daily PRN    ipratropium-albuterol (DUO-NEB) 0.5-2.5 mg/3 mL inhalation solution 3 mL, Q6H PRN    magnesium sulfate 2 g/50 mL IVPB (premix) 2 g, Once, Last Rate: 2 g (03/09/25 1106)    metoprolol tartrate (LOPRESSOR) partial tablet 12.5 mg, Q12H JOSIANE    nicotine (NICODERM CQ) 7 mg/24hr TD 24 hr patch 1 patch, Daily    polyethylene glycol (MIRALAX) packet 17 g, Daily    senna-docusate sodium (SENOKOT S) 8.6-50 mg per tablet 1 tablet, HS    tamsulosin (FLOMAX) capsule 0.4 mg, Daily With Dinner    umeclidinium-vilanterol 62.5-25 mcg/actuation inhaler 1 puff, Daily    Administrative Statements   Today, Patient Was Seen By: TIFFANIE Rowell  I have spent a total time of 49 minutes in caring for this patient on the day of the visit/encounter including Diagnostic results, Risks and benefits of tx options, Instructions for management, Patient and family education, Importance of tx compliance, Risk factor reductions, Impressions, Counseling / Coordination of care, Documenting in the medical record, Reviewing/placing orders in the medical record (including tests, medications, and/or procedures), Obtaining or reviewing history  , and Communicating with other healthcare professionals .    **Please Note: This note may have been constructed using a voice recognition system.**

## 2025-03-09 NOTE — ASSESSMENT & PLAN NOTE
Evolving shortly after admission with tachycardia, and fever; Lactic acid level normal, procalcitonin slightly elevated at 0.41  Did receive 1x dose of IV Ceftriaxone in the ER, received 1 L IVF Bolus  UA with small leukocytes, with small occult blood and 2-4 WBC; urine culture without growth.  Rapid antigen for covid, influenza negative.  No leukocytosis but did have elevated neutrophil count on CBC  Blood cultures negative x 24 hours.  CT Chest Abd Pelvis (3/8/25): 1. No acute CT findings in the chest, abdomen or pelvis to account for fever. 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  .4, exact etiology for SIRS is unclear but will continue IV Ceftriaxone at this time.

## 2025-03-09 NOTE — PLAN OF CARE
Problem: PAIN - ADULT  Goal: Verbalizes/displays adequate comfort level or baseline comfort level  Description: Interventions:  - Encourage patient to monitor pain and request assistance  - Assess pain using appropriate pain scale  - Administer analgesics based on type and severity of pain and evaluate response  - Implement non-pharmacological measures as appropriate and evaluate response  - Consider cultural and social influences on pain and pain management  - Notify physician/advanced practitioner if interventions unsuccessful or patient reports new pain  Outcome: Progressing     Problem: INFECTION - ADULT  Goal: Absence or prevention of progression during hospitalization  Description: INTERVENTIONS:  - Assess and monitor for signs and symptoms of infection  - Monitor lab/diagnostic results  - Monitor all insertion sites, i.e. indwelling lines, tubes, and drains  - Monitor endotracheal if appropriate and nasal secretions for changes in amount and color  - [place] appropriate cooling/warming therapies per order  - Administer medications as ordered  - Instruct and encourage patient and family to use good hand hygiene technique  - Identify and instruct in appropriate isolation precautions for identified infection/condition  Outcome: Progressing     Problem: SAFETY ADULT  Goal: Patient will remain free of falls  Description: INTERVENTIONS:  - Educate patient/family on patient safety including physical limitations  - Instruct patient to call for assistance with activity   - Consult OT/PT to assist with strengthening/mobility   - Keep Call bell within reach  - Keep bed low and locked with side rails adjusted as appropriate  - Keep care items and personal belongings within reach  - Initiate and maintain comfort rounds  - Make Fall Risk Sign visible to staff  - Offer Toileting every 2 Hours, in advance of need  - Initiate/Maintain bed/chair alarm  - Obtain necessary fall risk management equipment: call bell within  reach  - Apply yellow socks and bracelet for high fall risk patients  - Consider moving patient to room near nurses station  Outcome: Progressing     Problem: DISCHARGE PLANNING  Goal: Discharge to home or other facility with appropriate resources  Description: INTERVENTIONS:  - Identify barriers to discharge w/patient and caregiver  - Arrange for needed discharge resources and transportation as appropriate  - Identify discharge learning needs (meds, wound care, etc.)  - Arrange for interpretive services to assist at discharge as needed  - Refer to Case Management Department for coordinating discharge planning if the patient needs post-hospital services based on physician/advanced practitioner order or complex needs related to functional status, cognitive ability, or social support system  Outcome: Progressing     Problem: Knowledge Deficit  Goal: Patient/family/caregiver demonstrates understanding of disease process, treatment plan, medications, and discharge instructions  Description: Complete learning assessment and assess knowledge base.  Interventions:  - Provide teaching at level of understanding  - Provide teaching via preferred learning methods  Outcome: Progressing     Problem: RESPIRATORY - ADULT  Goal: Achieves optimal ventilation and oxygenation  Description: INTERVENTIONS:  - Assess for changes in respiratory status  - Assess for changes in mentation and behavior  - Position to facilitate oxygenation and minimize respiratory effort  - Oxygen administered by appropriate delivery if ordered  - Initiate smoking cessation education as indicated  - Encourage broncho-pulmonary hygiene including cough, deep breathe, Incentive Spirometry  - Assess the need for suctioning and aspirate as needed  - Assess and instruct to report SOB or any respiratory difficulty  - Respiratory Therapy support as indicated  Outcome: Progressing     Problem: CARDIOVASCULAR - ADULT  Goal: Absence of cardiac dysrhythmias or at  baseline rhythm  Description: INTERVENTIONS:  - Continuous cardiac monitoring, vital signs, obtain 12 lead EKG if ordered  - Administer antiarrhythmic and heart rate control medications as ordered  - Monitor electrolytes and administer replacement therapy as ordered  Outcome: Progressing     Problem: CARDIOVASCULAR - ADULT  Goal: Maintains optimal cardiac output and hemodynamic stability  Description: INTERVENTIONS:  - Monitor I/O, vital signs and rhythm  - Monitor for S/S and trends of decreased cardiac output  - Administer and titrate ordered vasoactive medications to optimize hemodynamic stability  - Assess quality of pulses, skin color and temperature  - Assess for signs of decreased coronary artery perfusion  - Instruct patient to report change in severity of symptoms  Outcome: Progressing

## 2025-03-09 NOTE — ASSESSMENT & PLAN NOTE
Seen on CT imaging  Patient is without any complaints of abdominal pain, nausea/vomiting today.  Does note some constipation  Questionable if this could be cause of fever and elevated CRP level  Will continue with IV Ceftriaxone at this time

## 2025-03-09 NOTE — PLAN OF CARE
Problem: PAIN - ADULT  Goal: Verbalizes/displays adequate comfort level or baseline comfort level  Description: Interventions:  - Encourage patient to monitor pain and request assistance  - Assess pain using appropriate pain scale  - Administer analgesics based on type and severity of pain and evaluate response  - Implement non-pharmacological measures as appropriate and evaluate response  - Consider cultural and social influences on pain and pain management  - Notify physician/advanced practitioner if interventions unsuccessful or patient reports new pain  Outcome: Progressing     Problem: INFECTION - ADULT  Goal: Absence or prevention of progression during hospitalization  Description: INTERVENTIONS:  - Assess and monitor for signs and symptoms of infection  - Monitor lab/diagnostic results  - Monitor all insertion sites, i.e. indwelling lines, tubes, and drains  - Monitor endotracheal if appropriate and nasal secretions for changes in amount and color  - [place] appropriate cooling/warming therapies per order  - Administer medications as ordered  - Instruct and encourage patient and family to use good hand hygiene technique  - Identify and instruct in appropriate isolation precautions for identified infection/condition  Outcome: Progressing  Goal: Absence of fever/infection during neutropenic period  Description: INTERVENTIONS:  - Monitor WBC    Outcome: Progressing     Problem: SAFETY ADULT  Goal: Patient will remain free of falls  Description: INTERVENTIONS:  - Educate patient/family on patient safety including physical limitations  - Instruct patient to call for assistance with activity   - Consult OT/PT to assist with strengthening/mobility   - Keep Call bell within reach  - Keep bed low and locked with side rails adjusted as appropriate  - Keep care items and personal belongings within reach  - Initiate and maintain comfort rounds  - Make Fall Risk Sign visible to staff  - Offer Toileting every  Hours,  in advance of need  - Initiate/Maintain alarm  - Obtain necessary fall risk management equipment:   - Apply yellow socks and bracelet for high fall risk patients  - Consider moving patient to room near nurses station  Outcome: Progressing  Goal: Maintain or return to baseline ADL function  Description: INTERVENTIONS:  -  Assess patient's ability to carry out ADLs; assess patient's baseline for ADL function and identify physical deficits which impact ability to perform ADLs (bathing, care of mouth/teeth, toileting, grooming, dressing, etc.)  - Assess/evaluate cause of self-care deficits   - Assess range of motion  - Assess patient's mobility; develop plan if impaired  - Assess patient's need for assistive devices and provide as appropriate  - Encourage maximum independence but intervene and supervise when necessary  - Involve family in performance of ADLs  - Assess for home care needs following discharge   - Consider OT consult to assist with ADL evaluation and planning for discharge  - Provide patient education as appropriate  Outcome: Progressing  Goal: Maintains/Returns to pre admission functional level  Description: INTERVENTIONS:  - Perform AM-PAC 6 Click Basic Mobility/ Daily Activity assessment daily.  - Set and communicate daily mobility goal to care team and patient/family/caregiver.   - Collaborate with rehabilitation services on mobility goals if consulted  - Perform Range of Motion  times a day.  - Reposition patient every  hours.  - Dangle patient  times a day  - Stand patient  times a day  - Ambulate patient  times a day  - Out of bed to chair  times a day   - Out of bed for meals  times a day  - Out of bed for toileting  - Record patient progress and toleration of activity level   Outcome: Progressing     Problem: DISCHARGE PLANNING  Goal: Discharge to home or other facility with appropriate resources  Description: INTERVENTIONS:  - Identify barriers to discharge w/patient and caregiver  - Arrange for  needed discharge resources and transportation as appropriate  - Identify discharge learning needs (meds, wound care, etc.)  - Arrange for interpretive services to assist at discharge as needed  - Refer to Case Management Department for coordinating discharge planning if the patient needs post-hospital services based on physician/advanced practitioner order or complex needs related to functional status, cognitive ability, or social support system  Outcome: Progressing     Problem: Knowledge Deficit  Goal: Patient/family/caregiver demonstrates understanding of disease process, treatment plan, medications, and discharge instructions  Description: Complete learning assessment and assess knowledge base.  Interventions:  - Provide teaching at level of understanding  - Provide teaching via preferred learning methods  Outcome: Progressing     Problem: CARDIOVASCULAR - ADULT  Goal: Maintains optimal cardiac output and hemodynamic stability  Description: INTERVENTIONS:  - Monitor I/O, vital signs and rhythm  - Monitor for S/S and trends of decreased cardiac output  - Administer and titrate ordered vasoactive medications to optimize hemodynamic stability  - Assess quality of pulses, skin color and temperature  - Assess for signs of decreased coronary artery perfusion  - Instruct patient to report change in severity of symptoms  Outcome: Progressing  Goal: Absence of cardiac dysrhythmias or at baseline rhythm  Description: INTERVENTIONS:  - Continuous cardiac monitoring, vital signs, obtain 12 lead EKG if ordered  - Administer antiarrhythmic and heart rate control medications as ordered  - Monitor electrolytes and administer replacement therapy as ordered  Outcome: Progressing     Problem: RESPIRATORY - ADULT  Goal: Achieves optimal ventilation and oxygenation  Description: INTERVENTIONS:  - Assess for changes in respiratory status  - Assess for changes in mentation and behavior  - Position to facilitate oxygenation and  minimize respiratory effort  - Oxygen administered by appropriate delivery if ordered  - Initiate smoking cessation education as indicated  - Encourage broncho-pulmonary hygiene including cough, deep breathe, Incentive Spirometry  - Assess the need for suctioning and aspirate as needed  - Assess and instruct to report SOB or any respiratory difficulty  - Respiratory Therapy support as indicated  Outcome: Progressing

## 2025-03-09 NOTE — ASSESSMENT & PLAN NOTE
Present on admission, raised erythematous rash with pruritus to bilateral arms/hands, and abdomen  Exact etiology unclear, however, has improved with PO Benadryl  Rash has resolved.

## 2025-03-09 NOTE — ASSESSMENT & PLAN NOTE
Noted history, does not appear to have active exacerbation at this time  Follows with [facility]  Continue home medication regimen - Anoro inhaler  Duonebs PRN

## 2025-03-09 NOTE — ASSESSMENT & PLAN NOTE
Evolving shortly after admission with tachycardia, and fever; Lactic acid level normal, procalcitonin slightly elevated at 0.41  Did receive 1x dose of IV Ceftriaxone in the ER, received 1 L IVF Bolus  UA with small leukocytes, with small occult blood and 2-4 WBC; urine culture without growth.  Rapid antigen for covid, influenza negative.  No leukocytosis but did have elevated neutrophil count on CBC  Blood cultures negative x 24 hours.  CT Chest Abd Pelvis (3/8/25): 1. No acute CT findings in the chest, abdomen or pelvis to account for fever. 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  .4, exact etiology for SIRS is unclear, questionable related to panniculitis, will continue IV Ceftriaxone at this time.

## 2025-03-09 NOTE — ASSESSMENT & PLAN NOTE
Patient presented to the ED with complaints of weakness and fatigue; had been seen and evaluated by his PCP in the office, given Voltaren gel.  Found to be in a-fib with rates in the 120s on admission  [id number] score 1  Monitor on telemetry  Continue with 12.5 mg Lopressor BID at this time, transition to Toprol 25 mg at time of DC  Echocardiogram showed LVEF 60% with normal diastolic function  Cardiology following  Canceled BRYCE/cardioversion as patient reverted to normal sinus rhythm, cardiology recommending outpatient stress test, as [id number] = 1 ASA, continue Toprol

## 2025-03-09 NOTE — ASSESSMENT & PLAN NOTE
New onset with RVR on admission   heart rates now in the 80s  Discussed pathophysiology of atrial fibrillation  Discussed treatment plans and medications  Start heparin drip  Continue Lopressor 12.5 twice daily  [id number] = 1  Discussed need for BRYCE and cardioversion on Monday if patient remains in atrial fibrillation  TTE pending  Monitor Tele

## 2025-03-10 ENCOUNTER — APPOINTMENT (INPATIENT)
Dept: NON INVASIVE DIAGNOSTICS | Facility: HOSPITAL | Age: 70
DRG: 309 | End: 2025-03-10
Payer: COMMERCIAL

## 2025-03-10 VITALS
SYSTOLIC BLOOD PRESSURE: 118 MMHG | HEIGHT: 66 IN | HEART RATE: 83 BPM | RESPIRATION RATE: 16 BRPM | OXYGEN SATURATION: 89 % | DIASTOLIC BLOOD PRESSURE: 77 MMHG | TEMPERATURE: 98.2 F | BODY MASS INDEX: 35.36 KG/M2 | WEIGHT: 220 LBS

## 2025-03-10 PROBLEM — I48.0 PAROXYSMAL ATRIAL FIBRILLATION (HCC): Status: ACTIVE | Noted: 2025-03-07

## 2025-03-10 LAB
ANION GAP SERPL CALCULATED.3IONS-SCNC: 7 MMOL/L (ref 4–13)
AORTIC ROOT: 3.5 CM
APTT PPP: 106 SECONDS (ref 23–34)
APTT PPP: 126 SECONDS (ref 23–34)
ASCENDING AORTA: 3.2 CM
ATRIAL RATE: 344 BPM
ATRIAL RATE: 348 BPM
ATRIAL RATE: 348 BPM
ATRIAL RATE: 92 BPM
BSA FOR ECHO PROCEDURE: 2.08 M2
BUN SERPL-MCNC: 13 MG/DL (ref 5–25)
CALCIUM SERPL-MCNC: 8.3 MG/DL (ref 8.4–10.2)
CHLORIDE SERPL-SCNC: 105 MMOL/L (ref 96–108)
CO2 SERPL-SCNC: 24 MMOL/L (ref 21–32)
CREAT SERPL-MCNC: 0.96 MG/DL (ref 0.6–1.3)
E WAVE DECELERATION TIME: 169 MS
E/A RATIO: 1.28
ERYTHROCYTE [DISTWIDTH] IN BLOOD BY AUTOMATED COUNT: 13 % (ref 11.6–15.1)
FRACTIONAL SHORTENING: 35 (ref 28–44)
GFR SERPL CREATININE-BSD FRML MDRD: 81 ML/MIN/1.73SQ M
GLUCOSE SERPL-MCNC: 108 MG/DL (ref 65–140)
HCT VFR BLD AUTO: 39.1 % (ref 36.5–49.3)
HGB BLD-MCNC: 13 G/DL (ref 12–17)
INTERVENTRICULAR SEPTUM IN DIASTOLE (PARASTERNAL SHORT AXIS VIEW): 1.1 CM
INTERVENTRICULAR SEPTUM: 1.1 CM (ref 0.6–1.1)
LA/AORTA RATIO 2D: 0.97
LAAS-AP2: 13.4 CM2
LAAS-AP4: 13.1 CM2
LEFT ATRIUM SIZE: 3.4 CM
LEFT ATRIUM VOLUME (MOD BIPLANE): 33 ML
LEFT ATRIUM VOLUME INDEX (MOD BIPLANE): 15.9 ML/M2
LEFT INTERNAL DIMENSION IN SYSTOLE: 3.2 CM (ref 2.1–4)
LEFT VENTRICULAR INTERNAL DIMENSION IN DIASTOLE: 4.9 CM (ref 3.5–6)
LEFT VENTRICULAR POSTERIOR WALL IN END DIASTOLE: 1.1 CM
LEFT VENTRICULAR STROKE VOLUME: 72 ML
LV EF US.2D.A4C+ESTIMATED: 66 %
LVSV (TEICH): 72 ML
MAGNESIUM SERPL-MCNC: 2.1 MG/DL (ref 1.9–2.7)
MCH RBC QN AUTO: 30.4 PG (ref 26.8–34.3)
MCHC RBC AUTO-ENTMCNC: 33.2 G/DL (ref 31.4–37.4)
MCV RBC AUTO: 91 FL (ref 82–98)
MV E'TISSUE VEL-SEP: 12 CM/S
MV PEAK A VEL: 0.67 M/S
MV PEAK E VEL: 86 CM/S
MV STENOSIS PRESSURE HALF TIME: 50 MS
MV VALVE AREA P 1/2 METHOD: 4.4
P AXIS: 77 DEGREES
P AXIS: 84 DEGREES
P AXIS: 84 DEGREES
PLATELET # BLD AUTO: 109 THOUSANDS/UL (ref 149–390)
PMV BLD AUTO: 9.3 FL (ref 8.9–12.7)
POTASSIUM SERPL-SCNC: 3.9 MMOL/L (ref 3.5–5.3)
PR INTERVAL: 240 MS
PROCALCITONIN SERPL-MCNC: 0.39 NG/ML
QRS AXIS: 83 DEGREES
QRS AXIS: 86 DEGREES
QRS AXIS: 90 DEGREES
QRS AXIS: 90 DEGREES
QRSD INTERVAL: 102 MS
QRSD INTERVAL: 102 MS
QRSD INTERVAL: 108 MS
QRSD INTERVAL: 98 MS
QT INTERVAL: 338 MS
QT INTERVAL: 358 MS
QT INTERVAL: 368 MS
QT INTERVAL: 380 MS
QTC INTERVAL: 406 MS
QTC INTERVAL: 440 MS
QTC INTERVAL: 442 MS
QTC INTERVAL: 469 MS
RBC # BLD AUTO: 4.28 MILLION/UL (ref 3.88–5.62)
RIGHT VENTRICLE ID DIMENSION: 2.5 CM
SL CV LV EF: 60
SL CV PED ECHO LEFT VENTRICLE DIASTOLIC VOLUME (MOD BIPLANE) 2D: 114 ML
SL CV PED ECHO LEFT VENTRICLE SYSTOLIC VOLUME (MOD BIPLANE) 2D: 42 ML
SODIUM SERPL-SCNC: 136 MMOL/L (ref 135–147)
T WAVE AXIS: -2 DEGREES
T WAVE AXIS: 59 DEGREES
T WAVE AXIS: 69 DEGREES
T WAVE AXIS: 74 DEGREES
TRICUSPID ANNULAR PLANE SYSTOLIC EXCURSION: 2.4 CM
VENTRICULAR RATE: 86 BPM
VENTRICULAR RATE: 87 BPM
VENTRICULAR RATE: 92 BPM
VENTRICULAR RATE: 92 BPM
WBC # BLD AUTO: 10.56 THOUSAND/UL (ref 4.31–10.16)

## 2025-03-10 PROCEDURE — 93306 TTE W/DOPPLER COMPLETE: CPT | Performed by: INTERNAL MEDICINE

## 2025-03-10 PROCEDURE — 93010 ELECTROCARDIOGRAM REPORT: CPT | Performed by: INTERNAL MEDICINE

## 2025-03-10 PROCEDURE — 84145 PROCALCITONIN (PCT): CPT | Performed by: FAMILY MEDICINE

## 2025-03-10 PROCEDURE — 85730 THROMBOPLASTIN TIME PARTIAL: CPT | Performed by: FAMILY MEDICINE

## 2025-03-10 PROCEDURE — 99239 HOSP IP/OBS DSCHRG MGMT >30: CPT

## 2025-03-10 PROCEDURE — 93306 TTE W/DOPPLER COMPLETE: CPT

## 2025-03-10 PROCEDURE — 80048 BASIC METABOLIC PNL TOTAL CA: CPT | Performed by: FAMILY MEDICINE

## 2025-03-10 PROCEDURE — 99232 SBSQ HOSP IP/OBS MODERATE 35: CPT | Performed by: INTERNAL MEDICINE

## 2025-03-10 PROCEDURE — 83735 ASSAY OF MAGNESIUM: CPT | Performed by: FAMILY MEDICINE

## 2025-03-10 PROCEDURE — 85027 COMPLETE CBC AUTOMATED: CPT | Performed by: FAMILY MEDICINE

## 2025-03-10 PROCEDURE — 93005 ELECTROCARDIOGRAM TRACING: CPT

## 2025-03-10 RX ORDER — METOPROLOL SUCCINATE 25 MG/1
25 TABLET, EXTENDED RELEASE ORAL DAILY
Qty: 30 TABLET | Refills: 0 | Status: SHIPPED | OUTPATIENT
Start: 2025-03-10 | End: 2025-04-09

## 2025-03-10 RX ORDER — ASPIRIN 81 MG/1
81 TABLET ORAL DAILY
Qty: 30 TABLET | Refills: 0 | Status: SHIPPED | OUTPATIENT
Start: 2025-03-11

## 2025-03-10 RX ORDER — METRONIDAZOLE 500 MG/1
500 TABLET ORAL EVERY 8 HOURS SCHEDULED
Qty: 9 TABLET | Refills: 0 | Status: SHIPPED | OUTPATIENT
Start: 2025-03-10 | End: 2025-03-13

## 2025-03-10 RX ORDER — HEPARIN SODIUM 5000 [USP'U]/ML
5000 INJECTION, SOLUTION INTRAVENOUS; SUBCUTANEOUS EVERY 8 HOURS SCHEDULED
Status: DISCONTINUED | OUTPATIENT
Start: 2025-03-10 | End: 2025-03-10 | Stop reason: HOSPADM

## 2025-03-10 RX ORDER — ASPIRIN 81 MG/1
81 TABLET ORAL DAILY
Status: DISCONTINUED | OUTPATIENT
Start: 2025-03-10 | End: 2025-03-10 | Stop reason: HOSPADM

## 2025-03-10 RX ADMIN — UMECLIDINIUM BROMIDE AND VILANTEROL TRIFENATATE 1 PUFF: 62.5; 25 POWDER RESPIRATORY (INHALATION) at 09:30

## 2025-03-10 RX ADMIN — BENZONATATE 100 MG: 100 CAPSULE ORAL at 09:27

## 2025-03-10 RX ADMIN — NICOTINE 1 PATCH: 7 PATCH, EXTENDED RELEASE TRANSDERMAL at 09:29

## 2025-03-10 RX ADMIN — HEPARIN SODIUM 5000 UNITS: 5000 INJECTION INTRAVENOUS; SUBCUTANEOUS at 14:25

## 2025-03-10 RX ADMIN — DIPHENHYDRAMINE HYDROCHLORIDE 25 MG: 25 SOLUTION ORAL at 03:22

## 2025-03-10 RX ADMIN — DIPHENHYDRAMINE HYDROCHLORIDE 25 MG: 25 SOLUTION ORAL at 09:30

## 2025-03-10 RX ADMIN — CEFTRIAXONE SODIUM 2000 MG: 10 INJECTION, POWDER, FOR SOLUTION INTRAVENOUS at 10:25

## 2025-03-10 RX ADMIN — POLYETHYLENE GLYCOL 3350 17 G: 17 POWDER, FOR SOLUTION ORAL at 09:29

## 2025-03-10 RX ADMIN — METOPROLOL TARTRATE 12.5 MG: 25 TABLET, FILM COATED ORAL at 09:27

## 2025-03-10 RX ADMIN — ASPIRIN 81 MG: 81 TABLET, COATED ORAL at 10:27

## 2025-03-10 NOTE — ASSESSMENT & PLAN NOTE
Seen on CT imaging  Patient is without any complaints of abdominal pain, nausea/vomiting today.  Does note some constipation  Questionable if this could be cause of fever and elevated CRP level  Discontinue ceftriaxone, will start short course of vaginal, although low suspicion for true bacterial etiology, recommended to follow-up with her PCP upon DC

## 2025-03-10 NOTE — CASE MANAGEMENT
Case Management Discharge Planning Note    Patient name Peter QUIGLEY Ryan  Location 2 [facility] 270/2 E 270-01 MRN [id number]  : 1955 Date 3/10/2025       Current Admission Date: 3/7/2025  Current Admission Diagnosis:A-fib (HCC)   Patient Active Problem List    Diagnosis Date Noted Date Diagnosed    Mesenteric panniculitis (HCC) 2025     SIRS (systemic inflammatory response syndrome) (HCC) 2025     Other microscopic hematuria 2025     Dysuria 2025     Urticaria 2025     A-fib (HCC) 2025     Obesity (BMI 30.0-34.9) 2024     Sleep disorder 10/19/2023     Excessive daytime sleepiness 10/19/2023     Chronic pain of right knee 10/19/2023     BPH associated with nocturia 10/19/2023     Other fatigue 10/19/2023     Hyperglycemia 10/19/2023     Cigarette nicotine dependence without complication 10/05/2022     Screening for lung cancer 10/05/2022     COPD, severe (HCC) 10/05/2022     Primary osteoarthritis of right knee 10/05/2022     Other insomnia 10/05/2022     Cervical spondylosis without myelopathy 11/15/2018       LOS (days): 3  Geometric Mean LOS (GMLOS) (days):   Days to GMLOS:     OBJECTIVE:  Risk of Unplanned Readmission Score: 7.9         Current admission status: Inpatient   Preferred Pharmacy:   [id number] - [place], [place] - [address]  [address]  [place] [address]  Phone: [phone] Fax: [phone]    Primary Care Provider: Rigoberto Casas PA-C    Primary Insurance: [employer]  REP  Secondary Insurance:     DISCHARGE DETAILS:    Transport at Discharge : Ride Share        Transported by (Company and Unit #): [employer]       IMM Given (Date):: 03/10/25 (IMM reviewed with pt at bedside. Pt verbalized understanding of appeal rights. IMM placed in scan bin.)  IMM Given to:: Patient     Additional Comments: Pt reports needing lyft transport to his car located at [facility] as that is where his car is parked. [employer] waiver reviewed  and signed at this time and placed in scan bin.     ADDENDUM 3:07pm: CM informed pt ready for discharge. CM setup 3:30pm lyft pickup at main entrance. Nursing aware.

## 2025-03-10 NOTE — DISCHARGE INSTR - AVS FIRST PAGE
Please be aware I have added an antibiotic that you should take approximately every 8 hours or 3 times a day called Flagyl.  This antibiotic should be taken for the next 3 days.  I have also ordered a medication called metoprolol which should help with avoiding any kind of additional A-fib.  Please take this medication once a day.  Lastly it is recommended to take a baby aspirin daily as well.  Please see your cardiologist as they have recommended outpatient testing such as a stress test.  Please see your PCP within a week for further continuity care.

## 2025-03-10 NOTE — PROGRESS NOTES
"Cardiology Progress Note - Peter Davis 69 y.o. male MRN: [id number]    Unit/Bed#: 2 E 270-01 Encounter: [id number]      Assessment & Plan  New onset paroxysmal atrial fibrillation  New onset A-fib with RVR noted upon admission.  Telemetry shows conversion to NSR.  Cancel plan for BRYCE/DCCV.  Continue Lopressor, recommend transition to Toprol-XL upon discharge.  [id number] 1 (age), start ASA 81 mg daily.  TTE is unremarkable with EF 60%.  Plan for outpatient stress test to assess for evidence of underlying ischemia and outpatient sleep study to assess for evidence of JOANIE.  If patient experiences recurrence of A-fib, may be reasonable to consider referral to EP for ablation evaluation.  SIRS (systemic inflammatory response syndrome) (HCC)  Management per primary service.      Cardiology will sign off at this time, please reach out as needed.  We will plan for outpatient follow-up.      Subjective:   Patient seen and examined.  No significant events overnight.  Patient resting in bed comfortably.  Reports he feels very well overall today.  Denies any new complaints at this time.  All questions were answered.    Objective:     Vitals: Blood pressure 118/77, pulse 95, temperature 98.2 °F (36.8 °C), resp. rate 18, height 5' 6\" (1.676 m), weight 99.8 kg (220 lb), SpO2 (!) 88%., Body mass index is 35.51 kg/m².,   Orthostatic Blood Pressures      Flowsheet Row Most Recent Value   Blood Pressure 118/77 filed at 03/10/2025 0929   Patient Position - Orthostatic VS Lying filed at 03/10/2025 0700              Intake/Output Summary (Last 24 hours) at 3/10/2025 1012  Last data filed at 3/10/2025 0723  Gross per 24 hour   Intake 0 ml   Output 450 ml   Net -450 ml         Physical Exam:  GEN: Alert and oriented x3, in no acute distress.  Well appearing and well nourished.   HEENT: Sclera anicteric, conjunctivae pink, mucous membranes moist. Oropharynx clear.   NECK: Supple, no carotid bruits, no significant JVD. Trachea midline, " no thyromegaly.   HEART: Regular rhythm, normal S1 and S2, no murmurs, clicks, gallops or rubs. PMI nondisplaced, no thrills.   LUNGS: Clear to auscultation bilaterally; no wheezes, rales, or rhonchi. No increased work of breathing or signs of respiratory distress.   ABDOMEN: Soft, nontender, nondistended, normoactive bowel sounds.   EXTREMITIES: Skin warm and well perfused, no clubbing or cyanosis, no edema.  NEURO: No focal findings. Normal speech. Mood and affect normal.   SKIN: Normal without suspicious lesions on exposed skin.        Medications:      Current Facility-Administered Medications:     acetaminophen (TYLENOL) tablet 650 mg, 650 mg, Oral, Q6H PRN, TIFFANIE Rowell, 650 mg at 03/08/25 1929    aspirin (ECOTRIN LOW STRENGTH) EC tablet 81 mg, 81 mg, Oral, Daily, Tay Hollins PA-C    benzonatate (TESSALON PERLES) capsule 100 mg, 100 mg, Oral, TID, TIFFANIE Rowell, 100 mg at 03/10/25 0927    cefTRIAXone (ROCEPHIN) 2,000 mg in dextrose 5 % 50 mL IVPB, 2,000 mg, Intravenous, Q24H, TIFFANIE Rowell    diphenhydrAMINE (BENADRYL) oral liquid 25 mg, 25 mg, Oral, Q6H, Osmel AGUIRRE Delaney, DO, 25 mg at 03/10/25 0930    heparin (porcine) subcutaneous injection 5,000 Units, 5,000 Units, Subcutaneous, Q8H [facility], Tay Hollins PA-C    hydrocortisone 1 % cream, , Topical, 4x Daily PRN, Osmel AGUIRRE Delaney DO, 1 Application at 03/07/25 1547    ipratropium-albuterol (DUO-NEB) 0.5-2.5 mg/3 mL inhalation solution 3 mL, 3 mL, Nebulization, Q6H PRN, Jo Tineo PA-C, 3 mL at 03/08/25 0009    metoprolol tartrate (LOPRESSOR) partial tablet 12.5 mg, 12.5 mg, Oral, Q12H JOSIANE, Het D Delaney, DO, 12.5 mg at 03/10/25 0927    nicotine (NICODERM CQ) 7 mg/24hr TD 24 hr patch 1 patch, 1 patch, Transdermal, Daily, Het D DO Rhett, 1 patch at 03/10/25 0929    polyethylene glycol (MIRALAX) packet 17 g, 17 g, Oral, Daily, TIFFANIE Rowell, 17 g at 03/10/25 0929    senna-docusate sodium (SENOKOT S)  "8.6-50 mg per tablet 1 tablet, 1 tablet, Oral, HS, Abbi Tyrone, CRNP, 1 tablet at 25 2132    tamsulosin (FLOMAX) capsule 0.4 mg, 0.4 mg, Oral, Daily With Dinner, Het D Rhett, , 0.4 mg at 25 1549    umeclidinium-vilanterol 62.5-25 mcg/actuation inhaler 1 puff, 1 puff, Inhalation, Daily, Het D Rhett DO, 1 puff at 03/10/25 0930     Labs & Results:         Results from last 7 days   Lab Units 03/10/25  03325  0429 25  1036   WBC Thousand/uL 10.56* 9.82 8.18   HEMOGLOBIN g/dL 13.0 13.4 13.5   HEMATOCRIT % 39.1 40.9 41.1   PLATELETS Thousands/uL 109* 113* 131*         Results from last 7 days   Lab Units 03/10/25  0336 25  0429 25  0434 25  1222   POTASSIUM mmol/L 3.9 3.6 4.2 4.2   CHLORIDE mmol/L 105 105 106 106   CO2 mmol/L 24 25 24 24   BUN mg/dL 13 13 16 15   CREATININE mg/dL 0.96 0.97 1.01 0.89   CALCIUM mg/dL 8.3* 8.5 8.7 9.0   ALK PHOS U/L  --   --   --  82   ALT U/L  --   --   --  25   AST U/L  --   --   --  18     Results from last 7 days   Lab Units 03/10/25  0324 25  1935 25  1251 25  1606 25  1138 25  1222   INR   --   --   --   --  1.06 1.03   PTT seconds 106* 143* 72*   < > 40* 32    < > = values in this interval not displayed.     Results from last 7 days   Lab Units 03/10/25  03325  0429   MAGNESIUM mg/dL 2.1 1.8*       Vitals: Blood pressure 118/77, pulse 95, temperature 98.2 °F (36.8 °C), resp. rate 18, height 5' 6\" (1.676 m), weight 99.8 kg (220 lb), SpO2 (!) 88%., Body mass index is 35.51 kg/m².,   Orthostatic Blood Pressures      Flowsheet Row Most Recent Value   Blood Pressure 118/77 filed at 03/10/2025 0929   Patient Position - Orthostatic VS Lying filed at 03/10/2025 0700            Systolic (24hrs), Av , Min:102 , Max:130     Diastolic (24hrs), Av, Min:65, Max:87        Intake/Output Summary (Last 24 hours) at 3/10/2025 1012  Last data filed at 3/10/2025 0723  Gross per 24 hour   Intake 0 ml "   Output 450 ml   Net -450 ml       Invasive Devices       Peripheral Intravenous Line  Duration             Peripheral IV 03/08/25 Right Antecubital 1 day                          BP Readings from Last 3 Encounters:   03/10/25 118/77   03/07/25 110/70   02/26/25 128/76      Wt Readings from Last 3 Encounters:   03/10/25 99.8 kg (220 lb)   03/07/25 101 kg (222 lb)   02/26/25 99.8 kg (220 lb)

## 2025-03-10 NOTE — PLAN OF CARE
Problem: INFECTION - ADULT  Goal: Absence or prevention of progression during hospitalization  Description: INTERVENTIONS:  - Assess and monitor for signs and symptoms of infection  - Monitor lab/diagnostic results  - Monitor all insertion sites, i.e. indwelling lines, tubes, and drains  - Monitor endotracheal if appropriate and nasal secretions for changes in amount and color  - [place] appropriate cooling/warming therapies per order  - Administer medications as ordered  - Instruct and encourage patient and family to use good hand hygiene technique  - Identify and instruct in appropriate isolation precautions for identified infection/condition  Outcome: Progressing  Goal: Absence of fever/infection during neutropenic period  Description: INTERVENTIONS:  - Monitor WBC    Outcome: Progressing     Problem: SAFETY ADULT  Goal: Patient will remain free of falls  Description: INTERVENTIONS:  - Educate patient/family on patient safety including physical limitations  - Instruct patient to call for assistance with activity   - Consult OT/PT to assist with strengthening/mobility   - Keep Call bell within reach  - Keep bed low and locked with side rails adjusted as appropriate  - Keep care items and personal belongings within reach  - Initiate and maintain comfort rounds  - Make Fall Risk Sign visible to staff  - Offer Toileting every Hours, in advance of need  - Initiate/Maintain alarm  - Obtain necessary fall risk management equipment:   - Apply yellow socks and bracelet for high fall risk patients  - Consider moving patient to room near nurses station  Outcome: Progressing  Goal: Maintain or return to baseline ADL function  Description: INTERVENTIONS:  -  Assess patient's ability to carry out ADLs; assess patient's baseline for ADL function and identify physical deficits which impact ability to perform ADLs (bathing, care of mouth/teeth, toileting, grooming, dressing, etc.)  - Assess/evaluate cause of self-care  deficits   - Assess range of motion  - Assess patient's mobility; develop plan if impaired  - Assess patient's need for assistive devices and provide as appropriate  - Encourage maximum independence but intervene and supervise when necessary  - Involve family in performance of ADLs  - Assess for home care needs following discharge   - Consider OT consult to assist with ADL evaluation and planning for discharge  - Provide patient education as appropriate  Outcome: Progressing  Goal: Maintains/Returns to pre admission functional level  Description: INTERVENTIONS:  - Perform AM-PAC 6 Click Basic Mobility/ Daily Activity assessment daily.  - Set and communicate daily mobility goal to care team and patient/family/caregiver.   - Collaborate with rehabilitation services on mobility goals if consulted  - Perform Range of Motion  times a day.  - Reposition patient every  hours.  - Dangle patient times a day  - Stand patient 2 times a day  - Ambulate patient  times a day  - Out of bed to chair  times a day   - Out of bed for meals 2 times a day  - Out of bed for toileting  - Record patient progress and toleration of activity level   Outcome: Progressing     Problem: DISCHARGE PLANNING  Goal: Discharge to home or other facility with appropriate resources  Description: INTERVENTIONS:  - Identify barriers to discharge w/patient and caregiver  - Arrange for needed discharge resources and transportation as appropriate  - Identify discharge learning needs (meds, wound care, etc.)  - Arrange for interpretive services to assist at discharge as needed  - Refer to Case Management Department for coordinating discharge planning if the patient needs post-hospital services based on physician/advanced practitioner order or complex needs related to functional status, cognitive ability, or social support system  Outcome: Progressing     Problem: Knowledge Deficit  Goal: Patient/family/caregiver demonstrates understanding of disease process,  treatment plan, medications, and discharge instructions  Description: Complete learning assessment and assess knowledge base.  Interventions:  - Provide teaching at level of understanding  - Provide teaching via preferred learning methods  Outcome: Progressing      less than 2 seconds

## 2025-03-10 NOTE — DISCHARGE SUMMARY
Discharge Summary - Hospitalist   Name: Peter Davis 69 y.o. male I MRN: [id number]  Unit/Bed#: 2 E 270-01 I Date of Admission: 3/7/2025   Date of Service: 3/10/2025 I Hospital Day: 3     Assessment & Plan  A-fib (HCC)  Patient presented to the ED with complaints of weakness and fatigue; had been seen and evaluated by his PCP in the office, given Voltaren gel.  Found to be in a-fib with rates in the 120s on admission  [id number] score 1  Monitor on telemetry  Continue with 12.5 mg Lopressor BID at this time, transition to Toprol 25 mg at time of DC  Echocardiogram showed LVEF 60% with normal diastolic function  Cardiology following  Canceled BRYCE/cardioversion as patient reverted to normal sinus rhythm, cardiology recommending outpatient stress test, as [id number] = 1 ASA, continue Toprol  COPD, severe (HCC)  Noted history, does not appear to have active exacerbation at this time  Follows with [facility]  Continue home medication regimen - Anoro inhaler  Duonebs PRN  BPH associated with nocturia  Continue Flomax  Urticaria  Present on admission, raised erythematous rash with pruritus to bilateral arms/hands, and abdomen  Exact etiology unclear, however, has improved with PO Benadryl  Rash has resolved.  SIRS (systemic inflammatory response syndrome) (HCC)  Evolving shortly after admission with tachycardia, and fever; Lactic acid level normal, procalcitonin slightly elevated at 0.41  Did receive 1x dose of IV Ceftriaxone in the ER, received 1 L IVF Bolus  UA with small leukocytes, with small occult blood and 2-4 WBC; urine culture without growth.  Rapid antigen for covid, influenza negative.  No leukocytosis but did have elevated neutrophil count on CBC  Blood cultures negative x 24 hours.  CT Chest Abd Pelvis (3/8/25): 1. No acute CT findings in the chest, abdomen or pelvis to account for fever. 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  .4, exact etiology for SIRS is unclear, questionable related  to panniculitis, will continue IV Ceftriaxone at this time.  Cigarette nicotine dependence without complication  Continues to smoke, 1/2 ppd  Nicotine patch  Mesenteric panniculitis (HCC)  Seen on CT imaging  Patient is without any complaints of abdominal pain, nausea/vomiting today.  Does note some constipation  Questionable if this could be cause of fever and elevated CRP level  Discontinue ceftriaxone, will start short course of vaginal, although low suspicion for true bacterial etiology, recommended to follow-up with her PCP upon DC     Discharging Physician / Practitioner: Christopher Hutson PA-C  PCP: Rigoberto Casas PA-C  Admission Date:   Admission Orders (From admission, onward)       Ordered        03/07/25 1319  INPATIENT ADMISSION  Once                          Discharge Date: 03/10/25    Consultations During Hospital Stay:  IP CONSULT TO CARDIOLOGY  IP CONSULT TO CASE MANAGEMENT    Procedures Performed:   None    Significant Findings / Test Results:   CT chest abdomen pelvis w contrast  Result Date: 3/8/2025  Impression: 1.  No acute CT findings in the chest, abdomen or pelvis to account for fever. 2.  Stable tiny pulmonary micronodules. 3.  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 Typically, this is self-limited, incidental finding. Consideration for follow-up CT abdomen in 4-6 months to ensure stability may be based on clinical grounds (any constitutional symptoms). 4.  3.4 cm area of mixed groundglass density with rim sclerosis right femoral intertrochanteric region, possibly representing focal fibrous dysplasia or lipo sclerosing myxofibrous tumor. Recommend follow-up right hip radiographs in 6 months to assure stability. The study was marked in EPIC for immediate notification and follow-up. Workstation performed: [id number]     XR chest 1 view portable  Result Date: 3/7/2025  Impression: No acute cardiopulmonary disease.  "Workstation performed: [id number]       No Chest XR results available for this patient.     Results for orders placed during the hospital encounter of 03/07/25    Echo complete w/ contrast if indicated    Interpretation Summary    Left Ventricle: Left ventricular cavity size is normal. Wall thickness is normal. The left ventricular ejection fraction is 60%. Systolic function is normal. Wall motion is normal. Diastolic function is normal.      No results for input(s): \"BLOODCX\", \"SPUTUMCULTUR\", \"GRAMSTAIN\", \"URINECX\", \"WOUNDCULT\", \"BODYFLUIDCUL\", \"MRSACULTURE\", \"INFLUAPCR\", \"INFLUBPCR\", \"RSVPCR\", \"LEGIONELLAUR\", \"STPU\", \"CDIFFTOXINB\" in the last 72 hours.    Incidental Findings:   None other than noted above; I reviewed the above mentioned incidental findings with the patient and/or family and they expressed understanding.    Test Results Pending at Discharge (will require follow up):   None     Outpatient Tests Requested:  None    Complications:  None    Reason for Admission: Atrial Fibrillation (Per EMS, patient was reported to have new onset afib today at urgent care while being treated for allergic reaction from voltaren cream. Patient reports SOB , hx of COPD. /)       Hospital Course:   Patient initially reported to his PCP after undergoing muscle pains and weakness.  He was prescribed Voltaren gel.  Shortly after receiving Voltaren gel patient initiated itchiness on hands and rash prompted him to be seen again by his PCP.  Ultimately he was recommended to go to the ED he was found to have new A-fib RVR.  While in the ED he was given 2 doses of Cardizem which had minimal improvement.  Patient also met SIRS criteria and started on empiric antibiotics.  Cardiology was consulted who recommended to start IV heparin drip and monitoring patient on beta-blockers.  If patient continued to have A-fib with RVR they were recommending a BRYCE with cardioversion.  By Monday the patient reverted to normal sinus rhythm and BRYCE " "cardioversion were not indicated during hospitalization.  The patient will be discharged home with a short supply of antibiotics as patient possibly had a pancolitis and the patient will also be discharged with Toprol, baby aspirin, and an outpatient stress test with cardiology follow-up.  At this time patient is medically stable to be discharged and agreeable to plan to discharge.  For further information regards to course hospitalization, please notes attached.      Please see above list of diagnoses and related plan for additional information.     Condition at Discharge: good    Discharge Day Visit / Exam:   Subjective: Patient reports to be feeling well and like to be discharged home.  He currently denies any chest pain/pressure, palpitations, lightness, nausea, shortness of breath, or chills.  Offers no new complaints at this time. No acute events reported overnight. Understanding of plan.  All questions answered to the best of my ability at this time to patient satisfaction. Plan of care agreed upon.  Vitals: Blood Pressure: 118/77 (03/10/25 1048)  Pulse: 83 (03/10/25 1048)  Temperature: 98.2 °F (36.8 °C) (03/10/25 0723)  Temp Source: Oral (03/10/25 0700)  Respirations: 16 (03/10/25 1048)  Height: 5' 6\" (167.6 cm) (03/10/25 0908)  Weight - Scale: 99.8 kg (220 lb) (03/10/25 0908)  SpO2: (!) 89 % (03/10/25 1048)  Exam:   Physical Exam  Vitals and nursing note reviewed.   Constitutional:       General: He is not in acute distress.     Appearance: He is obese. He is not ill-appearing, toxic-appearing or diaphoretic.   HENT:      Head: Normocephalic.      Nose: Nose normal.      Mouth/Throat:      Mouth: Mucous membranes are moist.      Pharynx: Oropharynx is clear.   Eyes:      General: No scleral icterus.     Conjunctiva/sclera: Conjunctivae normal.      Pupils: Pupils are equal, round, and reactive to light.   Cardiovascular:      Rate and Rhythm: Normal rate and regular rhythm.      Heart sounds: No murmur " heard.     No friction rub. No gallop.   Pulmonary:      Effort: Pulmonary effort is normal. No respiratory distress.      Breath sounds: Normal breath sounds. No stridor. No wheezing, rhonchi or rales.   Abdominal:      General: Abdomen is flat.      Palpations: Abdomen is soft.   Musculoskeletal:         General: Normal range of motion.      Cervical back: Normal range of motion and neck supple.      Right lower leg: No edema.      Left lower leg: No edema.   Lymphadenopathy:      Cervical: No cervical adenopathy.   Skin:     General: Skin is warm.      Coloration: Skin is not jaundiced or pale.      Findings: No bruising, erythema or lesion.   Neurological:      General: No focal deficit present.      Mental Status: He is alert and oriented to person, place, and time. Mental status is at baseline.      Cranial Nerves: No cranial nerve deficit.      Motor: No weakness.   Psychiatric:         Mood and Affect: Mood normal.         Behavior: Behavior normal.         Thought Content: Thought content normal.          Discussion with Family: Patient declined call to .     Discharge instructions/Information to patient and family:   See after visit summary for information provided to patient and family.      Provisions for Follow-Up Care:  See after visit summary for information related to follow-up care and any pertinent home health orders.       Mobility at time of Discharge:   Basic Mobility Inpatient Raw Score: 22  JH-HLM Goal: 7: Walk 25 feet or more  JH-HLM Achieved: 7: Walk 25 feet or more  HLM Goal achieved. Continue to encourage appropriate mobility.    Disposition:   Home    Planned Readmission: none     Discharge Statement:  I spent 65 minutes discharging the patient. This time was spent on the day of discharge. I had direct contact with the patient on the day of discharge. Greater than 50% of the total time was spent examining patient, answering all patient questions, arranging and discussing  plan of care with patient as well as directly providing post-discharge instructions.  Additional time then spent on discharge activities.    Discharge Medications:  See after visit summary for reconciled discharge medications provided to patient and/or family.      **Please Note: This note may have been constructed using a voice recognition system**

## 2025-03-10 NOTE — ASSESSMENT & PLAN NOTE
New onset A-fib with RVR noted upon admission.  Telemetry shows conversion to NSR.  Cancel plan for BRYCE/DCCV.  Continue Lopressor, recommend transition to Toprol-XL upon discharge.  [id number] 1 (age), start ASA 81 mg daily.  TTE is unremarkable with EF 60%.  Plan for outpatient stress test to assess for evidence of underlying ischemia and outpatient sleep study to assess for evidence of JOANIE.  If patient experiences recurrence of A-fib, may be reasonable to consider referral to EP for ablation evaluation.

## 2025-03-10 NOTE — PLAN OF CARE
Problem: PAIN - ADULT  Goal: Verbalizes/displays adequate comfort level or baseline comfort level  Description: Interventions:  - Encourage patient to monitor pain and request assistance  - Assess pain using appropriate pain scale  - Administer analgesics based on type and severity of pain and evaluate response  - Implement non-pharmacological measures as appropriate and evaluate response  - Consider cultural and social influences on pain and pain management  - Notify physician/advanced practitioner if interventions unsuccessful or patient reports new pain  Outcome: Progressing     Problem: INFECTION - ADULT  Goal: Absence or prevention of progression during hospitalization  Description: INTERVENTIONS:  - Assess and monitor for signs and symptoms of infection  - Monitor lab/diagnostic results  - Monitor all insertion sites, i.e. indwelling lines, tubes, and drains  - Monitor endotracheal if appropriate and nasal secretions for changes in amount and color  - [place] appropriate cooling/warming therapies per order  - Administer medications as ordered  - Instruct and encourage patient and family to use good hand hygiene technique  - Identify and instruct in appropriate isolation precautions for identified infection/condition  Outcome: Progressing  Goal: Absence of fever/infection during neutropenic period  Description: INTERVENTIONS:  - Monitor WBC    Outcome: Progressing     Problem: SAFETY ADULT  Goal: Patient will remain free of falls  Description: INTERVENTIONS:  - Educate patient/family on patient safety including physical limitations  - Instruct patient to call for assistance with activity   - Consult OT/PT to assist with strengthening/mobility   - Keep Call bell within reach  - Keep bed low and locked with side rails adjusted as appropriate  - Keep care items and personal belongings within reach  - Initiate and maintain comfort rounds  - Make Fall Risk Sign visible to staff  - Offer Toileting every 2 Hours,  in advance of need  - Initiate/Maintain alarm  - Obtain necessary fall risk management equipment:   - Apply yellow socks and bracelet for high fall risk patients  - Consider moving patient to room near nurses station  Outcome: Progressing  Goal: Maintain or return to baseline ADL function  Description: INTERVENTIONS:  -  Assess patient's ability to carry out ADLs; assess patient's baseline for ADL function and identify physical deficits which impact ability to perform ADLs (bathing, care of mouth/teeth, toileting, grooming, dressing, etc.)  - Assess/evaluate cause of self-care deficits   - Assess range of motion  - Assess patient's mobility; develop plan if impaired  - Assess patient's need for assistive devices and provide as appropriate  - Encourage maximum independence but intervene and supervise when necessary  - Involve family in performance of ADLs  - Assess for home care needs following discharge   - Consider OT consult to assist with ADL evaluation and planning for discharge  - Provide patient education as appropriate  Outcome: Progressing  Goal: Maintains/Returns to pre admission functional level  Description: INTERVENTIONS:  - Perform AM-PAC 6 Click Basic Mobility/ Daily Activity assessment daily.  - Set and communicate daily mobility goal to care team and patient/family/caregiver.   - Collaborate with rehabilitation services on mobility goals if consulted  - Perform Range of Motion 3 times a day.  - Reposition patient every 2 hours.  - Dangle patient 3 times a day  - Stand patient 3 times a day  - Ambulate patient 3 times a day  - Out of bed to chair 3 times a day   - Out of bed for meals 3 times a day  - Out of bed for toileting  - Record patient progress and toleration of activity level   Outcome: Progressing     Problem: DISCHARGE PLANNING  Goal: Discharge to home or other facility with appropriate resources  Description: INTERVENTIONS:  - Identify barriers to discharge w/patient and caregiver  -  Arrange for needed discharge resources and transportation as appropriate  - Identify discharge learning needs (meds, wound care, etc.)  - Arrange for interpretive services to assist at discharge as needed  - Refer to Case Management Department for coordinating discharge planning if the patient needs post-hospital services based on physician/advanced practitioner order or complex needs related to functional status, cognitive ability, or social support system  Outcome: Progressing     Problem: Knowledge Deficit  Goal: Patient/family/caregiver demonstrates understanding of disease process, treatment plan, medications, and discharge instructions  Description: Complete learning assessment and assess knowledge base.  Interventions:  - Provide teaching at level of understanding  - Provide teaching via preferred learning methods  Outcome: Progressing     Problem: CARDIOVASCULAR - ADULT  Goal: Maintains optimal cardiac output and hemodynamic stability  Description: INTERVENTIONS:  - Monitor I/O, vital signs and rhythm  - Monitor for S/S and trends of decreased cardiac output  - Administer and titrate ordered vasoactive medications to optimize hemodynamic stability  - Assess quality of pulses, skin color and temperature  - Assess for signs of decreased coronary artery perfusion  - Instruct patient to report change in severity of symptoms  Outcome: Progressing  Goal: Absence of cardiac dysrhythmias or at baseline rhythm  Description: INTERVENTIONS:  - Continuous cardiac monitoring, vital signs, obtain 12 lead EKG if ordered  - Administer antiarrhythmic and heart rate control medications as ordered  - Monitor electrolytes and administer replacement therapy as ordered  Outcome: Progressing     Problem: RESPIRATORY - ADULT  Goal: Achieves optimal ventilation and oxygenation  Description: INTERVENTIONS:  - Assess for changes in respiratory status  - Assess for changes in mentation and behavior  - Position to facilitate oxygenation  and minimize respiratory effort  - Oxygen administered by appropriate delivery if ordered  - Initiate smoking cessation education as indicated  - Encourage broncho-pulmonary hygiene including cough, deep breathe, Incentive Spirometry  - Assess the need for suctioning and aspirate as needed  - Assess and instruct to report SOB or any respiratory difficulty  - Respiratory Therapy support as indicated  Outcome: Progressing

## 2025-03-11 ENCOUNTER — TRANSITIONAL CARE MANAGEMENT (OUTPATIENT)
Dept: FAMILY MEDICINE CLINIC | Facility: CLINIC | Age: 70
End: 2025-03-11

## 2025-03-11 ENCOUNTER — TELEPHONE (OUTPATIENT)
Dept: CARDIOLOGY CLINIC | Facility: CLINIC | Age: 70
End: 2025-03-11

## 2025-03-11 NOTE — TELEPHONE ENCOUNTER
----- Message from Tay Hollins PA-C sent at 3/10/2025  4:01 PM EDT -----  Regarding: Hosp f/u  Patient clinical visit in 1 month at the Cardio location: [place] office.    Schedule visit with Austin Hollins or first available provider.    Type of Visit: VISIT TYPE: in-person office visit.    Test ordered: Cardiac tests: stress test and sleep study.    Additional Details: New onset A-fib with RVR

## 2025-03-11 NOTE — PROGRESS NOTES
3/11 ml called and lmom tcb and kaia tcm appt   SCx on 10/4+Methicillin resistant Staphylococcus aureus, c/w IVPB Vancomycin, (plan for 6 week course in setting of valve surgery, 11/26 end date)  9/27 blood culture Neela Glabarata, on flucanazole (lifelong suppression)  R elbow wound, S/p IR for elbow drainage 10/13 + Few Proteus mirabilis ESBL, Meropenem 7 day trial completed  Joint Fluid Cx 10/22 - p. mirabilis s/p Meropenem for 8 day course  cont vanco (until 11/26 for 6 week course) and fluconazole for lifelong suppression  needs to be on isolation until vanco complete and then re culture 3 days later and if clear can be off isolation  Arrow catheter replaced on 11/12 SCx on 10/4+Methicillin resistant Staphylococcus aureus, c/w IVPB Vancomycin, (plan for 6 week course in setting of valve surgery, 11/15 end date)  9/27 blood culture Neela Glabarata, on flucanazole (lifelong suppression)  R elbow wound, S/p IR for elbow drainage 10/13 + Few Proteus mirabilis ESBL, Meropenem 7 day trial completed  Joint Fluid Cx 10/22 - p. mirabilis s/p Meropenem for 8 day course  cont vanco (until 11/15for 6 week course) and fluconazole for lifelong suppression  needs to be on isolation until vanco complete and then re culture 3 days later and if clear can be off isolation  Arrow catheter replaced on 11/12

## 2025-03-12 ENCOUNTER — RA CDI HCC (OUTPATIENT)
Dept: OTHER | Facility: HOSPITAL | Age: 70
End: 2025-03-12

## 2025-03-12 ENCOUNTER — TELEPHONE (OUTPATIENT)
Age: 70
End: 2025-03-12

## 2025-03-12 PROBLEM — Z12.2 SCREENING FOR LUNG CANCER: Status: RESOLVED | Noted: 2022-10-05 | Resolved: 2025-03-12

## 2025-03-12 LAB
BACTERIA BLD CULT: NORMAL
BACTERIA BLD CULT: NORMAL

## 2025-03-12 NOTE — TELEPHONE ENCOUNTER
Patient stated he had the Medicare A and B and then the [employer]. Medicare comes back e-rejected.     Can this please be reviewed with the patient when he has the cards in office. Maybe the number might be off.

## 2025-03-12 NOTE — TELEPHONE ENCOUNTER
New Patient    Appointment Scheduling  What office location does the patient prefer?:  [place]  What is the reason for the patient's appointment?: NP Referral - Hypogonadism male   Have patient records been requested?: no  If No, are the records showing in Epic: yes    Appointment Details  Date: 4/2/2025  Time: 11:40 am     Location:   [place]  Provider:  Belinda Wise  Does the appointment need further review? (Reason) N/A    Patient is going to ask his PCP if the appt with Urology is definitely needed. Patient may call back to cancel depending the the provider response    HISTORY  Is the patient having active symptoms? If so, describe symptoms: Not mentioned  Has the patient had any previous Urologist(s)?: No  Was the patient seen in the ED?: No  Has the patient had any outside testing done?: No  Does patient have Imaging/Lab Results: Recent labs and CT in chart if related  Have you had Cancer Unknown    INSURANCE   Have you confirmed Patient's insurance? Yes  Is the insurance accepted?  Yes  Is the insurance active? Unsure    Patient states the Medicare A and B is his primary. Ran it again, it still comes back E-Rejected. Can this please be reviewed if the number are correct when the patient comes in

## 2025-03-13 ENCOUNTER — TELEPHONE (OUTPATIENT)
Dept: SLEEP CENTER | Facility: CLINIC | Age: 70
End: 2025-03-13

## 2025-03-13 NOTE — TELEPHONE ENCOUNTER
Referral placed for a home sleep study. Note does not reflect discussion of symptoms listed on order.    Please addend note with discussion of snoring.     Following a review of the Sleep Study/Sleep Consult ordering and insurance approval process, going forward, the Sleep Medicine Department is asking that the specific symptoms selected within the order for a sleep study and/or Sleep Medicine Consult (i.e. witnessed apneas, snoring, daytime sleepiness, etc.) be directly discussed (if even briefly) within the note from the face-to-face encounter. This is a slight change from what was asked in the past, but will help with proper compliance from a coding and insurance approval standpoint, ensuring that sleep studies are able to obtain insurance approval and that patients are then able to obtain the studies without difficulty. Thank you!    Ordering and co-signing providers notified.

## 2025-03-18 ENCOUNTER — OFFICE VISIT (OUTPATIENT)
Dept: FAMILY MEDICINE CLINIC | Facility: CLINIC | Age: 70
End: 2025-03-18
Payer: COMMERCIAL

## 2025-03-18 VITALS
WEIGHT: 214 LBS | TEMPERATURE: 97.3 F | HEIGHT: 66 IN | BODY MASS INDEX: 34.39 KG/M2 | HEART RATE: 84 BPM | SYSTOLIC BLOOD PRESSURE: 118 MMHG | DIASTOLIC BLOOD PRESSURE: 78 MMHG | OXYGEN SATURATION: 95 %

## 2025-03-18 DIAGNOSIS — F17.210 CIGARETTE NICOTINE DEPENDENCE WITHOUT COMPLICATION: ICD-10-CM

## 2025-03-18 DIAGNOSIS — I48.91 ATRIAL FIBRILLATION WITH RAPID VENTRICULAR RESPONSE (HCC): Primary | ICD-10-CM

## 2025-03-18 DIAGNOSIS — M25.50 POLYARTHRALGIA: ICD-10-CM

## 2025-03-18 DIAGNOSIS — L50.9 URTICARIA: ICD-10-CM

## 2025-03-18 PROCEDURE — 99495 TRANSJ CARE MGMT MOD F2F 14D: CPT | Performed by: PHYSICIAN ASSISTANT

## 2025-03-18 RX ORDER — NICOTINE 21 MG/24HR
1 PATCH, TRANSDERMAL 24 HOURS TRANSDERMAL EVERY 24 HOURS
Qty: 28 PATCH | Refills: 0 | Status: SHIPPED | OUTPATIENT
Start: 2025-03-18

## 2025-03-18 NOTE — ASSESSMENT & PLAN NOTE
Pt motivated to quit smoking  NRT patches    I have spent a total time of 5 minutes in caring for this patient on the day of the visit/encounter including discussing smoking cessation    Orders:  •  nicotine (NICODERM CQ) 14 mg/24hr TD 24 hr patch; Place 1 patch on the skin over 24 hours every 24 hours  •  nicotine (NICODERM CQ) 7 mg/24hr TD 24 hr patch; Place 1 patch on the skin over 24 hours every 24 hours Begin after completion of the 14mg daily patch
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
## 2025-03-20 ENCOUNTER — RESULTS FOLLOW-UP (OUTPATIENT)
Dept: FAMILY MEDICINE CLINIC | Facility: CLINIC | Age: 70
End: 2025-03-20

## 2025-03-20 ENCOUNTER — HOSPITAL ENCOUNTER (OUTPATIENT)
Dept: NON INVASIVE DIAGNOSTICS | Facility: CLINIC | Age: 70
Discharge: HOME | End: 2025-03-20
Payer: COMMERCIAL

## 2025-03-20 VITALS
HEART RATE: 85 BPM | HEIGHT: 66 IN | BODY MASS INDEX: 34.39 KG/M2 | SYSTOLIC BLOOD PRESSURE: 116 MMHG | OXYGEN SATURATION: 98 % | DIASTOLIC BLOOD PRESSURE: 88 MMHG | WEIGHT: 214 LBS

## 2025-03-20 DIAGNOSIS — I48.91 ATRIAL FIBRILLATION WITH RAPID VENTRICULAR RESPONSE (HCC): ICD-10-CM

## 2025-03-20 DIAGNOSIS — I20.89 ANGINAL EQUIVALENT (HCC): ICD-10-CM

## 2025-03-20 LAB
RATE PRESSURE PRODUCT: NORMAL
SL CV REST NUCLEAR ISOTOPE DOSE: 10.81 MCI
SL CV STRESS NUCLEAR ISOTOPE DOSE: 31.7 MCI
SL CV STRESS RECOVERY BP: NORMAL MMHG
SL CV STRESS RECOVERY HR: 104 BPM
SL CV STRESS RECOVERY O2 SAT: 97 %
SPECT HRT GATED+EF W RNC IV: 58 %
STRESS ANGINA INDEX: 0
STRESS BASELINE BP: NORMAL MMHG
STRESS BASELINE HR: 85 BPM
STRESS O2 SAT REST: 98 %
STRESS PEAK HR: 115 BPM
STRESS POST O2 SAT PEAK: 96 %
STRESS POST PEAK BP: 148 MMHG

## 2025-03-20 PROCEDURE — 93016 CV STRESS TEST SUPVJ ONLY: CPT | Performed by: INTERNAL MEDICINE

## 2025-03-20 PROCEDURE — 93017 CV STRESS TEST TRACING ONLY: CPT

## 2025-03-20 PROCEDURE — 78452 HT MUSCLE IMAGE SPECT MULT: CPT

## 2025-03-20 PROCEDURE — 93018 CV STRESS TEST I&R ONLY: CPT | Performed by: INTERNAL MEDICINE

## 2025-03-20 PROCEDURE — 78452 HT MUSCLE IMAGE SPECT MULT: CPT | Performed by: INTERNAL MEDICINE

## 2025-03-20 PROCEDURE — A9502 TC99M TETROFOSMIN: HCPCS

## 2025-03-20 RX ORDER — REGADENOSON 0.08 MG/ML
0.4 INJECTION, SOLUTION INTRAVENOUS ONCE
Status: COMPLETED | OUTPATIENT
Start: 2025-03-20 | End: 2025-03-20

## 2025-03-20 RX ADMIN — REGADENOSON 0.4 MG: 0.08 INJECTION, SOLUTION INTRAVENOUS at 12:42

## 2025-03-21 LAB
CHEST PAIN STATEMENT: NORMAL
MAX DIASTOLIC BP: 76 MMHG
MAX PREDICTED HEART RATE: 153 BPM
PROTOCOL NAME: NORMAL
REASON FOR TERMINATION: NORMAL
STRESS POST EXERCISE DUR MIN: 3 MIN
STRESS POST EXERCISE DUR SEC: 0 SEC
STRESS POST PEAK HR: 115 BPM
STRESS POST PEAK SYSTOLIC BP: 148 MMHG
TARGET HR FORMULA: NORMAL

## 2025-04-01 NOTE — ASSESSMENT & PLAN NOTE
Denies palpitations.  HR well-controlled, continue Toprol-XL.  [id number] 1, continue ASA.  Pharmacological MPI stress test negative for evidence of stress-induced myocardial ischemia.  Previously referred to sleep medicine for sleep study to assess for evidence of JOANIE.  Advised to notify our office for any new/worsening symptoms
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
## 2025-04-01 NOTE — PATIENT INSTRUCTIONS
Please follow instructions as recommended during visit.  Recommend low salt DASH diet  Strongly encourage compliance with your medications.   Please reach out to us if you have any questions   Recommend you present to the emergency room or call our office if you have chest pain, chest pressure, shortness of breath, any new, persistent or progressive symptoms.     Please call [phone] to set up appointment to assess for sleep apnea.

## 2025-04-02 ENCOUNTER — CONSULT (OUTPATIENT)
Dept: UROLOGY | Facility: CLINIC | Age: 70
End: 2025-04-02
Payer: COMMERCIAL

## 2025-04-02 VITALS
HEIGHT: 66 IN | HEART RATE: 80 BPM | TEMPERATURE: 98.2 F | DIASTOLIC BLOOD PRESSURE: 86 MMHG | SYSTOLIC BLOOD PRESSURE: 122 MMHG | WEIGHT: 216 LBS | BODY MASS INDEX: 34.72 KG/M2 | OXYGEN SATURATION: 96 %

## 2025-04-02 DIAGNOSIS — E29.1 HYPOGONADISM MALE: ICD-10-CM

## 2025-04-02 DIAGNOSIS — N52.8 OTHER MALE ERECTILE DYSFUNCTION: ICD-10-CM

## 2025-04-02 DIAGNOSIS — R79.89 LOW TESTOSTERONE: Primary | ICD-10-CM

## 2025-04-02 DIAGNOSIS — R35.1 BPH ASSOCIATED WITH NOCTURIA: ICD-10-CM

## 2025-04-02 DIAGNOSIS — N40.1 BPH ASSOCIATED WITH NOCTURIA: ICD-10-CM

## 2025-04-02 DIAGNOSIS — Z12.5 SCREENING FOR PROSTATE CANCER: ICD-10-CM

## 2025-04-02 PROCEDURE — 99204 OFFICE O/P NEW MOD 45 MIN: CPT | Performed by: PHYSICIAN ASSISTANT

## 2025-04-02 NOTE — ASSESSMENT & PLAN NOTE
- Managed on Flomax  - Endorses urinary urgency and nocturia  - Recommend limiting bladder irritants, adequate water intake and nighttime fluid restriction   - UA micro (3/7/25) - 1-2 RBC, 2-4 WBCs, no bacteria  - PVR next visit.

## 2025-04-02 NOTE — PROGRESS NOTES
Name: Peter Davis      : 1955      MRN: [id number]  Encounter Provider: Belinda Wise PA-C  Encounter Date: 2025   Encounter department: [facility] UROLOGY [place]  :  Assessment & Plan  Low testosterone  - Testosterone from 3/1/25 was 153   - TSH normal  - Recent CBC - normal H&H  - Obtain repeat testosterone and additional labs for hypogonadism work-up- LH, FSH, prolactin   - Follow up to review results. Discussed initiation of TRT pending these results. Reviewed risks and benefits or TRT today  Orders:    Testosterone; Future    FSH and LH; Future    Prolactin; Future    Screening for prostate cancer  - PSA from 10/22/24 was 0.221  - GALILEO negative       Hypogonadism male    Orders:    Ambulatory Referral to Urology    BPH associated with nocturia  - Managed on Flomax  - Endorses urinary urgency and nocturia  - Recommend limiting bladder irritants, adequate water intake and nighttime fluid restriction   - UA micro (3/7/25) - 1-2 RBC, 2-4 WBCs, no bacteria  - PVR next visit.        Other male erectile dysfunction  - Failed Viagra in the past.   - Discussed trial of Cialis which he defers at this time.            History of Present Illness   Peter Davis is a 69 y.o. male who presents for new patient evaluation of low testosterone. He reports testosterone level was checked due to daytime sleepiness, fatigue, low energy, and joint pain. He also notes low libido and ED issues. Testosterone was 150. Denies any prior TRT or history of anabolic steroid use. Denies JOANIE. Denies history of heart attack, stroke, blood clots. Does endorse some nocturia and urinary urgency and is managed on Flomax with mild benefit. Denies any prior  surgeries. Reports issues with partial erections and failed Viagra in the past due to side effects.     Review of Systems   Constitutional:  Positive for fatigue. Negative for chills and fever.   Respiratory:  Negative for shortness of breath.     Cardiovascular:  Negative for chest pain.   Gastrointestinal:  Negative for abdominal pain.   Genitourinary:  Positive for frequency and urgency. Negative for difficulty urinating, dysuria, flank pain and hematuria.   Musculoskeletal:  Positive for arthralgias.   Neurological:  Negative for dizziness.        Objective   There were no vitals taken for this visit.    Physical Exam  Constitutional:       Appearance: Normal appearance.   HENT:      Head: Normocephalic and atraumatic.      Right Ear: External ear normal.      Left Ear: External ear normal.      Nose: Nose normal.   Eyes:      General: No scleral icterus.     Conjunctiva/sclera: Conjunctivae normal.   Cardiovascular:      Pulses: Normal pulses.   Pulmonary:      Effort: Pulmonary effort is normal.   Genitourinary:     Prostate: Not tender and no nodules present.   Musculoskeletal:         General: Normal range of motion.      Cervical back: Normal range of motion.   Skin:     General: Skin is warm and dry.   Neurological:      General: No focal deficit present.      Mental Status: He is alert and oriented to person, place, and time.   Psychiatric:         Mood and Affect: Mood normal.         Behavior: Behavior normal.         Thought Content: Thought content normal.         Judgment: Judgment normal.          Results   Lab Results   Component Value Date    PSA 0.221 10/22/2024    PSA 0.09 10/20/2023    PSA 0.3 10/06/2022     Lab Results   Component Value Date    CALCIUM 8.3 (L) 03/10/2025    K 3.9 03/10/2025    CO2 24 03/10/2025     03/10/2025    BUN 13 03/10/2025    CREATININE 0.96 03/10/2025     Lab Results   Component Value Date    WBC 10.56 (H) 03/10/2025    HGB 13.0 03/10/2025    HCT 39.1 03/10/2025    MCV 91 03/10/2025     (L) 03/10/2025       Office Urine Dip  No results found for this or any previous visit (from the past hour).

## 2025-04-03 ENCOUNTER — OFFICE VISIT (OUTPATIENT)
Dept: CARDIOLOGY CLINIC | Facility: CLINIC | Age: 70
End: 2025-04-03
Payer: COMMERCIAL

## 2025-04-03 VITALS
OXYGEN SATURATION: 95 % | SYSTOLIC BLOOD PRESSURE: 110 MMHG | WEIGHT: 213 LBS | HEART RATE: 88 BPM | BODY MASS INDEX: 34.23 KG/M2 | RESPIRATION RATE: 16 BRPM | DIASTOLIC BLOOD PRESSURE: 76 MMHG | HEIGHT: 66 IN

## 2025-04-03 DIAGNOSIS — I48.0 PAROXYSMAL ATRIAL FIBRILLATION (HCC): Primary | ICD-10-CM

## 2025-04-03 DIAGNOSIS — Z87.891 HISTORY OF TOBACCO USE: ICD-10-CM

## 2025-04-03 PROCEDURE — 99214 OFFICE O/P EST MOD 30 MIN: CPT

## 2025-04-03 RX ORDER — METOPROLOL SUCCINATE 25 MG/1
25 TABLET, EXTENDED RELEASE ORAL DAILY
Qty: 90 TABLET | Refills: 2 | Status: SHIPPED | OUTPATIENT
Start: 2025-04-03

## 2025-04-03 RX ORDER — ASPIRIN 81 MG/1
81 TABLET ORAL DAILY
Qty: 90 TABLET | Refills: 2 | Status: SHIPPED | OUTPATIENT
Start: 2025-04-03

## 2025-04-09 NOTE — PROGRESS NOTES
Rheumatology Initial Outpatient Visit  Name: Peter Davis      : 1955      MRN: [id number]  Encounter Provider: Cassie Balderrama MD  Encounter Date: 2025   Encounter department: [facility] [place]  :  Assessment & Plan  Polyarthralgia  69-year-old male who presents for further evaluation of generalized polyarthralgia and fatigue.  Patient reports joint pain in most of the joints throughout his body which has been chronic in nature.  Symptoms do not seem to be improved with activity but does report swelling in the hands and the wrists.  He previously had been diagnosed with PMR in 2018 but has been off of any steroids since around 2019.  His symptoms at this time are less suggestive of PMR given it is not only involving just the shoulders and the hips but seems to be more of a generalized polyarthritis.  Suspect that symptoms are related to generalized osteoarthritis as he has known osteoarthritis in knees and spine.  Reviewed x-rays of the shoulders and hands.  He does not have any significant arthritis in the hands/wrist and shoulders to explain his symptoms.  Question if symptoms in hand/wrist could be more related to a neuropathy, such as carpal tunnel versus cervical radiculopathy.  Similarly, question if pain in shoulders could be more related to rotator cuff disease versus referred pain from his neck.  Of note, he was recently found to have quite elevated inflammatory markers although this was in the context of a skin rash and A-fib.  I recommend that we obtain repeat inflammatory markers to ensure that they are improving.  I would recommend that we obtain x-rays of the C-spine, L-spine, and hips.  Given his reports of swelling in the hands and wrist, we will obtain an ultrasound of the hands and wrist to evaluate for any subclinical synovitis.  Additionally, we will also obtain ultrasound of the bilateral carpal tunnel to evaluate for any evidence of carpal tunnel.  If no  evidence of carpal tunnel, then would recommend to proceed with EMG to evaluate for any cervical radiculopathy.  We will consider referral to comprehensive spine clinic pending results.  PCP may also consider trial of Cymbalta which can be utilized to treat both osteoarthritis pain as well as neuropathic pain since patient has had no improvement on multiple NSAIDs.  Follow-up pending results.  Orders:    Ambulatory Referral to Rheumatology    C-reactive protein; Future    Sedimentation rate, automated; Future    XR hips bilateral 2 vw w pelvis if performed; Future    XR spine lumbar minimum 4 views non injury; Future    XR spine cervical 2 or 3 vw injury; Future    US RA Hands/Wrists; Future    Hand numbness    Orders:    US Carpal Tunnel; Future    Primary generalized (osteo)arthritis             History of Present Illness   HPI  Peter Davis is a 69 y.o. male who presents for evaluation of joint pain.  Patient was previously seen in [facility] rheumatology in 2018 for PMR.  Symptoms at that time were stiffness of shoulders and hips with elevated inflammatory markers.  He was started on a prednisone taper with complete improvement in his symptoms and was on prednisone in total for about 1.5 years before stopping.    He was then seen again in [facility] rheumatology in 2021 at which time he was complaining of generalized polyarthralgia.  Repeat inflammatory markers at that time were relatively normal.  His symptoms at that point in time were thought to be related to generalized osteoarthritis.  He was tried on several different NSAIDs without significant improvement.    Patient reports his main complaints at this point in time are fatigue and pain throughout most of his body.  He reports the fatigue is severe and reports that he feels like he ran a marathon after doing simple things like grocery shopping or doing laundry.  This has been ongoing for at least over 1 year.  He also reports difficulty sleeping  and does not sleep a significant amount at night.    He reports pain in most of his joints including hands, wrists, shoulders, neck, low back, posterior hips, bilateral knees, and bilateral ankles.  He reports pain is present daily although some days are better than others.  He reports no significant improvement in his symptoms with activity.  He reports generally feeling stiff throughout his entire body when he gets up in the morning.  This does not last for significantly long period of time.  He reports he does notice some swelling in his hands and his wrist.  He reports pain in his shoulders is most noticeable when he puts his arms behind his head.  Whenever his hands bother him he feels the need to shake his hands or open and close his fist to help them feel better.  This seems to get worse whenever he is sitting for prolonged periods of time such as watching TV or lying in bed.  He does have numbness and tingling in both hands.  He reports he has known osteoarthritis in the right knee and left ankle for which she has been told he needs joint replacements.  He reports that he also has known spine disease in his neck with pinched nerves.  He reports known spine disease in the lumbar spine as well.    Review of Systems  Complete ROS conducted as per HPI. In addition, denies:  Fever  Photosensitive rash  Muscle weakness  Uveitis  Dactylitis  Chest pain  SOB  Pleurisy  Joint issues other than noted above      Objective   /78 (BP Location: Left arm, Patient Position: Sitting, Cuff Size: Large)   Wt 97.4 kg (214 lb 12.8 oz)   BMI 34.67 kg/m²      Physical Exam  Physical Exam  Constitutional: well appearing, no acute distress  HEENT: normocephalic, sclera clear, no visible oral or nasal ulcers  Neck: supple, no palpable cervical adenopathy  CV: regular rate and rhythm, no murmur  Pulm: normal respiratory effort, lungs clear to auscultation b/l  Skin: no rashes, no skin thickening  Extremities: warm and well  perfused, no edema  MSK:  - Observation: No significant abnormalities  - Palpation: No significant tenderness to palpation  - Synovitis: Absent  - Effusion: Absent  - ROM: Diminished range of motion left knee and right ankle as well as bilateral shoulders secondary to pain      Labs and Imaging  I have personally reviewed pertinent labs and imaging.   DE, RF, Lyme negative  CRP 11.4 --> 112.4  ESR 23 --> 38  Thrombocytopenia

## 2025-04-11 ENCOUNTER — CONSULT (OUTPATIENT)
Dept: RHEUMATOLOGY | Facility: CLINIC | Age: 70
End: 2025-04-11
Payer: COMMERCIAL

## 2025-04-11 VITALS — SYSTOLIC BLOOD PRESSURE: 122 MMHG | WEIGHT: 214.8 LBS | BODY MASS INDEX: 34.67 KG/M2 | DIASTOLIC BLOOD PRESSURE: 78 MMHG

## 2025-04-11 DIAGNOSIS — M15.0 PRIMARY GENERALIZED (OSTEO)ARTHRITIS: ICD-10-CM

## 2025-04-11 DIAGNOSIS — R20.0 HAND NUMBNESS: ICD-10-CM

## 2025-04-11 DIAGNOSIS — M25.50 POLYARTHRALGIA: Primary | ICD-10-CM

## 2025-04-11 PROCEDURE — 99204 OFFICE O/P NEW MOD 45 MIN: CPT | Performed by: STUDENT IN AN ORGANIZED HEALTH CARE EDUCATION/TRAINING PROGRAM

## 2025-04-17 ENCOUNTER — LAB (OUTPATIENT)
Dept: LAB | Facility: CLINIC | Age: 70
End: 2025-04-17
Payer: COMMERCIAL

## 2025-04-17 DIAGNOSIS — R79.89 LOW TESTOSTERONE: ICD-10-CM

## 2025-04-17 DIAGNOSIS — M25.50 POLYARTHRALGIA: ICD-10-CM

## 2025-04-17 LAB
CRP SERPL QL: 24.6 MG/L
ERYTHROCYTE [SEDIMENTATION RATE] IN BLOOD: 48 MM/HOUR (ref 0–19)
PROLACTIN SERPL-MCNC: 5.62 NG/ML (ref 2.64–13.13)

## 2025-04-17 PROCEDURE — 36415 COLL VENOUS BLD VENIPUNCTURE: CPT

## 2025-04-17 PROCEDURE — 84146 ASSAY OF PROLACTIN: CPT

## 2025-04-17 PROCEDURE — 85652 RBC SED RATE AUTOMATED: CPT

## 2025-04-17 PROCEDURE — 83002 ASSAY OF GONADOTROPIN (LH): CPT

## 2025-04-17 PROCEDURE — 83001 ASSAY OF GONADOTROPIN (FSH): CPT

## 2025-04-17 PROCEDURE — 86140 C-REACTIVE PROTEIN: CPT

## 2025-04-17 PROCEDURE — 84403 ASSAY OF TOTAL TESTOSTERONE: CPT

## 2025-04-18 LAB
FSH SERPL-ACNC: 14.4 MIU/ML (ref 1.3–19.3)
LH SERPL-ACNC: 5.6 MIU/ML (ref 1.2–8.6)
TESTOST SERPL-MSCNC: 177 NG/DL (ref 175–781)

## 2025-04-21 ENCOUNTER — TRANSCRIBE ORDERS (OUTPATIENT)
Dept: SLEEP CENTER | Facility: CLINIC | Age: 70
End: 2025-04-21

## 2025-04-21 DIAGNOSIS — I48.91 ATRIAL FIBRILLATION WITH RAPID VENTRICULAR RESPONSE (HCC): ICD-10-CM

## 2025-04-21 DIAGNOSIS — R06.83 SNORING: ICD-10-CM

## 2025-04-21 DIAGNOSIS — G47.8 OTHER SLEEP DISORDERS: Primary | ICD-10-CM

## 2025-04-30 ENCOUNTER — TELEPHONE (OUTPATIENT)
Dept: UROLOGY | Facility: CLINIC | Age: 70
End: 2025-04-30

## 2025-04-30 NOTE — TELEPHONE ENCOUNTER
Tried calling pt but it stated it can not be completed at this time.       If pt call back please provide a friendly reminder of their coming appt is to go over their lab results. If pt please get their FSH and LH done prior to their visit.

## 2025-05-02 ENCOUNTER — HOSPITAL ENCOUNTER (OUTPATIENT)
Dept: ULTRASOUND IMAGING | Facility: HOSPITAL | Age: 70
End: 2025-05-02
Attending: STUDENT IN AN ORGANIZED HEALTH CARE EDUCATION/TRAINING PROGRAM
Payer: COMMERCIAL

## 2025-05-02 DIAGNOSIS — E55.9 VITAMIN D DEFICIENCY: ICD-10-CM

## 2025-05-02 DIAGNOSIS — R20.0 HAND NUMBNESS: ICD-10-CM

## 2025-05-02 PROCEDURE — 76882 US LMTD JT/FCL EVL NVASC XTR: CPT

## 2025-05-02 RX ORDER — ERGOCALCIFEROL 1.25 MG/1
CAPSULE, LIQUID FILLED ORAL
Qty: 12 CAPSULE | Refills: 1 | Status: SHIPPED | OUTPATIENT
Start: 2025-05-02

## 2025-05-06 ENCOUNTER — TELEPHONE (OUTPATIENT)
Age: 70
End: 2025-05-06

## 2025-05-06 NOTE — TELEPHONE ENCOUNTER
Please let patient know that ultrasound results showed that he does have findings consistent with carpal tunnel on both sides. This could be contributing to some of the symptoms he has in the hands including numbness, tingling and needing to shake hands or open and close hands to get the feeling back.  Generally first-line treatment for carpal tunnel is wearing wrist bracing at night while sleeping.  However, if he would like to see orthopedics to discuss other treatment options carpal tunnel I can also place a referral for this.    His CRP inflammation marker is significantly improved to 24 from 112 when he was hospitalized.  His sedimentation rate is elevated but roughly the same as when he was in the hospital. The sedimentation rate is often much more slow to improve compared to the CRP.    I am still waiting for him to complete the x-rays that I ordered of some of his other joints.  Additionally, he was supposed to have 2 ultrasounds done-1 was just to look for carpal tunnel but the other ultrasound was to get a closer look at his finger joints and wrist joints to see if there is any inflammation.  This can also be done in radiology.

## 2025-05-06 NOTE — TELEPHONE ENCOUNTER
Spoke with patient; gave him the results per Dr. Balderrama. He stated he will try the wrist brace first. He wasn't aware that he had further testing to have done; gave him the phone number for central scheduling and he will have the US as well as the x-rays done in the very near future. He had no other questions.

## 2025-05-06 NOTE — TELEPHONE ENCOUNTER
Pt calling in regards to result of US and labs requesting someone call him back to go over in detail

## 2025-05-07 ENCOUNTER — OFFICE VISIT (OUTPATIENT)
Dept: UROLOGY | Facility: CLINIC | Age: 70
End: 2025-05-07
Payer: COMMERCIAL

## 2025-05-07 VITALS
RESPIRATION RATE: 16 BRPM | OXYGEN SATURATION: 95 % | WEIGHT: 211.8 LBS | HEART RATE: 83 BPM | SYSTOLIC BLOOD PRESSURE: 124 MMHG | TEMPERATURE: 97.6 F | HEIGHT: 66 IN | BODY MASS INDEX: 34.04 KG/M2 | DIASTOLIC BLOOD PRESSURE: 76 MMHG

## 2025-05-07 DIAGNOSIS — R79.89 LOW TESTOSTERONE: Primary | ICD-10-CM

## 2025-05-07 DIAGNOSIS — R35.1 BPH ASSOCIATED WITH NOCTURIA: ICD-10-CM

## 2025-05-07 DIAGNOSIS — Z12.5 SCREENING FOR PROSTATE CANCER: ICD-10-CM

## 2025-05-07 DIAGNOSIS — N40.1 BPH ASSOCIATED WITH NOCTURIA: ICD-10-CM

## 2025-05-07 LAB
POST-VOID RESIDUAL VOLUME, ML POC: 15 ML
SL AMB  POCT GLUCOSE, UA: NORMAL
SL AMB LEUKOCYTE ESTERASE,UA: NORMAL
SL AMB POCT BILIRUBIN,UA: NORMAL
SL AMB POCT BLOOD,UA: NORMAL
SL AMB POCT CLARITY,UA: CLEAR
SL AMB POCT COLOR,UA: YELLOW
SL AMB POCT KETONES,UA: NORMAL
SL AMB POCT NITRITE,UA: NORMAL
SL AMB POCT PH,UA: 5
SL AMB POCT SPECIFIC GRAVITY,UA: 1.01
SL AMB POCT URINE PROTEIN: NORMAL
SL AMB POCT UROBILINOGEN: 0.2

## 2025-05-07 PROCEDURE — 81002 URINALYSIS NONAUTO W/O SCOPE: CPT | Performed by: PHYSICIAN ASSISTANT

## 2025-05-07 PROCEDURE — 51798 US URINE CAPACITY MEASURE: CPT | Performed by: PHYSICIAN ASSISTANT

## 2025-05-07 PROCEDURE — 99214 OFFICE O/P EST MOD 30 MIN: CPT | Performed by: PHYSICIAN ASSISTANT

## 2025-05-07 RX ORDER — TESTOSTERONE 1.62 MG/G
40.5 GEL TRANSDERMAL EVERY MORNING
Qty: 180 ACTUATION | Refills: 0 | Status: SHIPPED | OUTPATIENT
Start: 2025-05-07

## 2025-05-07 NOTE — TELEPHONE ENCOUNTER
There are no specific medication options for carpal tunnel. Treatment is primarily using wrist braces at night and if not improving with wrist braces then orthopedics can do various interventions such as injections or surgery.  Over-the-counter medicines like Tylenol and nonsteroidal anti-inflammatories can be helpful for some patients as well.  I can place a referral for orthopedics if he would like to hear about other options for treatment.

## 2025-05-07 NOTE — TELEPHONE ENCOUNTER
Patient returning call advised per Dr. Balderrama, There are no specific medication options for carpal tunnel. Treatment is primarily using wrist braces at night and if not improving with wrist braces then orthopedics can do various interventions such as injections or surgery.  Over-the-counter medicines like Tylenol and nonsteroidal anti-inflammatories can be helpful for some patients as well.  I can place a referral for orthopedics if he would like to hear about other options for treatment.     Patient said he will try the braces for now and does not want a referral yet but he will think about it.

## 2025-05-07 NOTE — ASSESSMENT & PLAN NOTE
- Managed on Flomax  - UA micro (3/7/25) - 1-2 RBC, 2-4 WBCs, no bacteria   - Urine dip today negative  - PVR 15 mL

## 2025-05-07 NOTE — PROGRESS NOTES
Name: Peter Davis      : 1955      MRN: [id number]  Encounter Provider: Belinda Wise PA-C  Encounter Date: 2025   Encounter department: [facility] UROLOGY [place]  :  Assessment & Plan  Low testosterone  - Testosterone from 3/1/25 was 153   - TSH normal  - CBC - normal H&H  - Testosterone from 25 was 177  - Prolactin, LH, FSH  normal   - Discussed starting TRT which he is agreeable to. Wishes to trial Androgel. Rx sent to pharmacy.   - Follow up in 3 months with labs.   Orders:    POCT Measure PVR    POCT urine dip    CBC and Platelet; Future    Testosterone; Future    testosterone (ANDROGEL) 1.62 % TD gel pump; Apply 2 actuation (40.5 mg total) topically every morning    Screening for prostate cancer  - PSA from 10/22/24 was 0.221  - GALILEO negative at recent visit     Orders:    POCT Measure PVR    POCT urine dip    BPH associated with nocturia  - Managed on Flomax  - UA micro (3/7/25) - 1-2 RBC, 2-4 WBCs, no bacteria   - Urine dip today negative  - PVR 15 mL        History of Present Illness   Peter Davis is a 69 y.o. male who presents for follow up of low testosterone and BPH. He reports testosterone level was checked due to daytime sleepiness, fatigue, low energy, and joint pain. He also notes low libido and ED issues. Testosterone was 150. Repeat testosterone 177. Other labs normal. Denies any prior TRT or history of anabolic steroid use. Denies JOANIE. Denies history of heart attack, stroke, blood clots. Does endorse some nocturia and urinary urgency and is managed on Flomax with mild benefit. Denies any prior  surgeries. Reports issues with partial erections and failed Viagra in the past due to side effects.           Review of Systems   Constitutional:  Positive for fatigue. Negative for chills and fever.   Respiratory:  Negative for shortness of breath.    Cardiovascular:  Negative for chest pain.   Gastrointestinal:  Negative for abdominal pain.   Genitourinary:   Positive for frequency and urgency. Negative for difficulty urinating, dysuria, flank pain and hematuria.   Neurological:  Negative for dizziness.          Objective   There were no vitals taken for this visit.    Physical Exam  Constitutional:       Appearance: Normal appearance.   HENT:      Head: Normocephalic and atraumatic.      Right Ear: External ear normal.      Left Ear: External ear normal.      Nose: Nose normal.   Eyes:      General: No scleral icterus.     Conjunctiva/sclera: Conjunctivae normal.   Cardiovascular:      Pulses: Normal pulses.   Pulmonary:      Effort: Pulmonary effort is normal.   Musculoskeletal:         General: Normal range of motion.      Cervical back: Normal range of motion.   Neurological:      General: No focal deficit present.      Mental Status: He is alert and oriented to person, place, and time.   Psychiatric:         Mood and Affect: Mood normal.         Behavior: Behavior normal.         Thought Content: Thought content normal.         Judgment: Judgment normal.          Results   Lab Results   Component Value Date    PSA 0.221 10/22/2024    PSA 0.09 10/20/2023    PSA 0.3 10/06/2022     Lab Results   Component Value Date    CALCIUM 8.3 (L) 03/10/2025    K 3.9 03/10/2025    CO2 24 03/10/2025     03/10/2025    BUN 13 03/10/2025    CREATININE 0.96 03/10/2025     Lab Results   Component Value Date    WBC 10.56 (H) 03/10/2025    HGB 13.0 03/10/2025    HCT 39.1 03/10/2025    MCV 91 03/10/2025     (L) 03/10/2025       Office Urine Dip  No results found for this or any previous visit (from the past hour).

## 2025-05-07 NOTE — TELEPHONE ENCOUNTER
Patient called and stated he is now interested in medication options for carpal tunnel. Patient requested a call back to discuss further. Please advise.

## 2025-05-08 ENCOUNTER — TELEPHONE (OUTPATIENT)
Dept: UROLOGY | Facility: AMBULATORY SURGERY CENTER | Age: 70
End: 2025-05-08

## 2025-05-08 NOTE — TELEPHONE ENCOUNTER
PA for TESTOSTERONE GEL 1.62%  APPROVED     Date(s) approved UNTIL 05/07/2026    Case #    Patient advised by          []MyChart Message  []Phone call   [x]LMOM  []L/M to call office as no active Communication consent on file  []Unable to leave detailed message as VM not approved on Communication consent       Pharmacy advised by    [x]Fax  []Phone call  []Secure Chat    Specialty Pharmacy    []     Approval letter scanned into Media Yes

## 2025-05-08 NOTE — TELEPHONE ENCOUNTER
PA for TESTOSTERONE GEL 1.62 % SUBMITTED to [employer]    via    [x]CMM-KEY: [id number]      [x]PA sent as URGENT    All office notes, labs and other pertaining documents and studies sent. Clinical questions answered. Awaiting determination from insurance company.     Turnaround time for your insurance to make a decision on your Prior Authorization can take 7-21 business days.

## 2025-05-09 NOTE — TELEPHONE ENCOUNTER
Patient was calling to see if the medication from [facility] was sent to his pharmacy.     Advised it was sent on 5/7, but the office was waiting on approval via prior auth from his insurance.     His insurance approved the medication yesterday. Recommended he contact the pharmacy to make sure they received the script and insurance prior auth so they can fill it for him.

## 2025-05-15 ENCOUNTER — APPOINTMENT (OUTPATIENT)
Dept: RADIOLOGY | Facility: CLINIC | Age: 70
End: 2025-05-15
Payer: COMMERCIAL

## 2025-05-15 DIAGNOSIS — M25.50 POLYARTHRALGIA: ICD-10-CM

## 2025-05-15 PROCEDURE — 73521 X-RAY EXAM HIPS BI 2 VIEWS: CPT

## 2025-05-15 PROCEDURE — 72110 X-RAY EXAM L-2 SPINE 4/>VWS: CPT

## 2025-05-15 PROCEDURE — 72040 X-RAY EXAM NECK SPINE 2-3 VW: CPT

## 2025-05-20 ENCOUNTER — RESULTS FOLLOW-UP (OUTPATIENT)
Age: 70
End: 2025-05-20

## 2025-05-20 DIAGNOSIS — M16.12 UNILATERAL OSTEOARTHRITIS OF HIP, LEFT: Primary | ICD-10-CM

## 2025-05-20 DIAGNOSIS — M54.50 CHRONIC BILATERAL LOW BACK PAIN WITHOUT SCIATICA: ICD-10-CM

## 2025-05-20 DIAGNOSIS — G89.29 CHRONIC BILATERAL LOW BACK PAIN WITHOUT SCIATICA: ICD-10-CM

## 2025-05-20 NOTE — TELEPHONE ENCOUNTER
Please let patient know that I reviewed his x-rays.  X-ray of the neck shows degenerative osteoarthritis changes throughout the neck.  X-ray of the low spine also shows degenerative osteoarthritis of the lumbar spine and disc narrowing at multiple levels.  X-ray of the left hip shows moderate degenerative osteoarthritis.  His right hip does not have any significant arthritis but does show his previous old right hip injury.  He also has a large bony density in the muscles around the right hip which could be also related to prior trauma.  These findings could are contributing to the pain he has been experiencing and I would have a lower suspicion for a primary autoimmune/rheumatologic cause.  If he would like to see orthopedics for his hip osteoarthritis I can place the referral.  If he would be interested in seeing our spine doctors to help address some of the back pain I can also place a referral for this.

## 2025-05-21 ENCOUNTER — TELEPHONE (OUTPATIENT)
Dept: PHYSICAL THERAPY | Facility: OTHER | Age: 70
End: 2025-05-21

## 2025-05-21 NOTE — TELEPHONE ENCOUNTER
"Call placed to the patient per [facility] referral.    Could not get through to Pt. Message stated \"call can not be completed as dialed, try your call again later\"    This is the 1st attempt to reach the patient.  Will defer per protocol.    NOTE: Referred into comp spine by order only- Rheumatology    X Ray cervical  X Ray lumbar    Has appt with Ortho for hip on 6/3/2025  "

## 2025-06-06 ENCOUNTER — OFFICE VISIT (OUTPATIENT)
Dept: OBGYN CLINIC | Facility: CLINIC | Age: 70
End: 2025-06-06
Payer: COMMERCIAL

## 2025-06-06 DIAGNOSIS — G56.03 CARPAL TUNNEL SYNDROME, BILATERAL: Primary | ICD-10-CM

## 2025-06-06 PROCEDURE — 99214 OFFICE O/P EST MOD 30 MIN: CPT | Performed by: STUDENT IN AN ORGANIZED HEALTH CARE EDUCATION/TRAINING PROGRAM

## 2025-06-06 PROCEDURE — 20526 THER INJECTION CARP TUNNEL: CPT | Performed by: STUDENT IN AN ORGANIZED HEALTH CARE EDUCATION/TRAINING PROGRAM

## 2025-06-06 RX ORDER — BETAMETHASONE SODIUM PHOSPHATE AND BETAMETHASONE ACETATE 3; 3 MG/ML; MG/ML
6 INJECTION, SUSPENSION INTRA-ARTICULAR; INTRALESIONAL; INTRAMUSCULAR; SOFT TISSUE
Status: COMPLETED | OUTPATIENT
Start: 2025-06-06 | End: 2025-06-06

## 2025-06-06 RX ORDER — ROPIVACAINE HYDROCHLORIDE 5 MG/ML
1 INJECTION, SOLUTION EPIDURAL; INFILTRATION; PERINEURAL
Status: COMPLETED | OUTPATIENT
Start: 2025-06-06 | End: 2025-06-06

## 2025-06-06 RX ADMIN — BETAMETHASONE SODIUM PHOSPHATE AND BETAMETHASONE ACETATE 6 MG: 3; 3 INJECTION, SUSPENSION INTRA-ARTICULAR; INTRALESIONAL; INTRAMUSCULAR; SOFT TISSUE at 12:15

## 2025-06-06 RX ADMIN — ROPIVACAINE HYDROCHLORIDE 1 ML: 5 INJECTION, SOLUTION EPIDURAL; INFILTRATION; PERINEURAL at 12:15
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
## 2025-06-06 NOTE — ASSESSMENT & PLAN NOTE
Suspicions for carpal tunnel syndrome bilateral wrists on US, possible cubital tunnel syndrome, discussed his neck may also be playing a role in his symptoms   Anatomy of the condition/s as well as treatment options and expected outcomes were discussed.  Any pertinent imaging or lab results were reviewed with the patient. US bilateral carpal tunnel was reviewed, suspicious for bilateral carpal tunnel syndrome.   We discussed he may also have cubital tunnel syndrome and his neck may be playing a role in his symptoms.   Treatment options were discussed in the form of nighttime bracing, diagnostic and therapeutic carpal tunnel CSI's and a EMG to further evaluated for peripheral nerve compression.   The patient verbalized understanding of exam findings and treatment plan.   The patient was given the opportunity to ask questions.  Questions were answered to the patient's satisfaction.  The patient decided to move forward with nighttime bracing and bilateral carpal tunnel CSI's. Bilateral carpal tunnel CSI's were performed in the office without complication. Post injection protocol/expectations were reviewed. He was advised to take note of symptoms following the CSI's as this can be diagnostic as well as therapeutic.   Orders:    Hand/upper extremity injection: bilateral carpal tunnel

## 2025-06-14 DIAGNOSIS — R35.1 BPH ASSOCIATED WITH NOCTURIA: ICD-10-CM

## 2025-06-14 DIAGNOSIS — N40.1 BPH ASSOCIATED WITH NOCTURIA: ICD-10-CM

## 2025-06-15 RX ORDER — TAMSULOSIN HYDROCHLORIDE 0.4 MG/1
0.4 CAPSULE ORAL
Qty: 100 CAPSULE | Refills: 1 | Status: SHIPPED | OUTPATIENT
Start: 2025-06-15

## 2025-06-26 ENCOUNTER — TELEPHONE (OUTPATIENT)
Age: 70
End: 2025-06-26

## 2025-06-26 NOTE — TELEPHONE ENCOUNTER
Patient called in stating he is scheduled for Jury duty on July 28th his jury id # [id number], but he states due to his medical condition he doesn't believe he would be able to do jury duty. Patient is asking if the provider would fill out a letter excusing him and fax this over to [phone]. Please call patient back then

## 2025-06-26 NOTE — TELEPHONE ENCOUNTER
He is calling back that his joint pain and a-fib. He can hardly use his hands with the pain he is in. He had an injection for his joint pain and it was back in 24 hr. He was told he may need surgery and he just feels he can't do jury duty.    He would like a copy of the letter also.

## 2025-06-27 ENCOUNTER — TELEPHONE (OUTPATIENT)
Dept: FAMILY MEDICINE CLINIC | Facility: CLINIC | Age: 70
End: 2025-06-27

## 2025-06-27 NOTE — TELEPHONE ENCOUNTER
Pt stopped in to  letter for jury duty.  He wanted to thank you and wish you a very happy 4th of July.

## 2025-07-15 ENCOUNTER — TELEPHONE (OUTPATIENT)
Dept: UROLOGY | Facility: CLINIC | Age: 70
End: 2025-07-15

## 2025-07-17 NOTE — TELEPHONE ENCOUNTER
Called and spoke to the pt- advised him the the appt date change- pt understood and had received the previous Vm that was left- he Confirmed the new appt on 8/1

## 2025-07-22 ENCOUNTER — TELEPHONE (OUTPATIENT)
Dept: UROLOGY | Facility: CLINIC | Age: 70
End: 2025-07-22

## 2025-07-22 NOTE — TELEPHONE ENCOUNTER
Called and left VM for the PT per CC. CBC & Testosterone is needed PTV with Belinda CHAVEZ on 8/01/25. Office number left for the PT if any questions.

## 2025-07-24 ENCOUNTER — APPOINTMENT (OUTPATIENT)
Dept: LAB | Facility: CLINIC | Age: 70
End: 2025-07-24
Payer: COMMERCIAL

## 2025-07-24 DIAGNOSIS — R79.89 LOW TESTOSTERONE: ICD-10-CM

## 2025-07-24 LAB
ERYTHROCYTE [DISTWIDTH] IN BLOOD BY AUTOMATED COUNT: 14.6 % (ref 11.6–15.1)
HCT VFR BLD AUTO: 44.8 % (ref 36.5–49.3)
HGB BLD-MCNC: 14.4 G/DL (ref 12–17)
MCH RBC QN AUTO: 28.9 PG (ref 26.8–34.3)
MCHC RBC AUTO-ENTMCNC: 32.1 G/DL (ref 31.4–37.4)
MCV RBC AUTO: 90 FL (ref 82–98)
PLATELET # BLD AUTO: 274 THOUSANDS/UL (ref 149–390)
PMV BLD AUTO: 9.3 FL (ref 8.9–12.7)
RBC # BLD AUTO: 4.98 MILLION/UL (ref 3.88–5.62)
TESTOST SERPL-MSCNC: 306 NG/DL (ref 175–781)
WBC # BLD AUTO: 7.65 THOUSAND/UL (ref 4.31–10.16)

## 2025-07-24 PROCEDURE — 84403 ASSAY OF TOTAL TESTOSTERONE: CPT

## 2025-07-24 PROCEDURE — 85027 COMPLETE CBC AUTOMATED: CPT

## 2025-07-24 PROCEDURE — 36415 COLL VENOUS BLD VENIPUNCTURE: CPT

## 2025-08-01 ENCOUNTER — OFFICE VISIT (OUTPATIENT)
Dept: UROLOGY | Facility: CLINIC | Age: 70
End: 2025-08-01
Payer: COMMERCIAL

## 2025-08-01 VITALS
TEMPERATURE: 97.6 F | SYSTOLIC BLOOD PRESSURE: 124 MMHG | DIASTOLIC BLOOD PRESSURE: 78 MMHG | HEART RATE: 93 BPM | HEIGHT: 66 IN | OXYGEN SATURATION: 98 % | WEIGHT: 203 LBS | BODY MASS INDEX: 32.62 KG/M2

## 2025-08-01 DIAGNOSIS — R79.89 LOW TESTOSTERONE: Primary | ICD-10-CM

## 2025-08-01 DIAGNOSIS — R10.31 RIGHT LOWER QUADRANT ABDOMINAL PAIN: ICD-10-CM

## 2025-08-01 DIAGNOSIS — N40.1 BENIGN PROSTATIC HYPERPLASIA WITH LOWER URINARY TRACT SYMPTOMS, SYMPTOM DETAILS UNSPECIFIED: ICD-10-CM

## 2025-08-01 DIAGNOSIS — Z12.5 SCREENING FOR PROSTATE CANCER: ICD-10-CM

## 2025-08-01 PROCEDURE — 99214 OFFICE O/P EST MOD 30 MIN: CPT | Performed by: PHYSICIAN ASSISTANT

## 2025-08-01 RX ORDER — TESTOSTERONE 1.62 MG/G
81 GEL TRANSDERMAL EVERY MORNING
Qty: 360 ACTUATION | Refills: 0 | Status: SHIPPED | OUTPATIENT
Start: 2025-08-01